# Patient Record
Sex: MALE | Race: WHITE | NOT HISPANIC OR LATINO | Employment: OTHER | URBAN - METROPOLITAN AREA
[De-identification: names, ages, dates, MRNs, and addresses within clinical notes are randomized per-mention and may not be internally consistent; named-entity substitution may affect disease eponyms.]

---

## 2017-01-12 ENCOUNTER — RESULTS ENCOUNTER (OUTPATIENT)
Dept: FAMILY MEDICINE CLINIC | Facility: CLINIC | Age: 72
End: 2017-01-12

## 2017-01-12 DIAGNOSIS — R73.01 IFG (IMPAIRED FASTING GLUCOSE): ICD-10-CM

## 2017-01-12 DIAGNOSIS — K62.5 BRBPR (BRIGHT RED BLOOD PER RECTUM): ICD-10-CM

## 2017-01-12 DIAGNOSIS — E78.5 HYPERLIPIDEMIA: ICD-10-CM

## 2017-02-09 LAB
ALBUMIN SERPL-MCNC: 3.8 G/DL (ref 3.5–4.8)
ALBUMIN/GLOB SERPL: 1.8 {RATIO} (ref 1.1–2.5)
ALP SERPL-CCNC: 103 IU/L (ref 39–117)
ALT SERPL-CCNC: 12 IU/L (ref 0–44)
AST SERPL-CCNC: 20 IU/L (ref 0–40)
BASOPHILS # BLD AUTO: 0 X10E3/UL (ref 0–0.2)
BASOPHILS NFR BLD AUTO: 0 %
BILIRUB SERPL-MCNC: 0.2 MG/DL (ref 0–1.2)
BUN SERPL-MCNC: 20 MG/DL (ref 8–27)
BUN/CREAT SERPL: 27 (ref 10–22)
CALCIUM SERPL-MCNC: 8.6 MG/DL (ref 8.6–10.2)
CHLORIDE SERPL-SCNC: 104 MMOL/L (ref 96–106)
CHOLEST SERPL-MCNC: 203 MG/DL (ref 100–199)
CO2 SERPL-SCNC: 22 MMOL/L (ref 18–29)
CREAT SERPL-MCNC: 0.73 MG/DL (ref 0.76–1.27)
EOSINOPHIL # BLD AUTO: 0.2 X10E3/UL (ref 0–0.4)
EOSINOPHIL NFR BLD AUTO: 4 %
ERYTHROCYTE [DISTWIDTH] IN BLOOD BY AUTOMATED COUNT: 14.1 % (ref 12.3–15.4)
GLOBULIN SER CALC-MCNC: 2.1 G/DL (ref 1.5–4.5)
GLUCOSE SERPL-MCNC: 105 MG/DL (ref 65–99)
HBA1C MFR BLD: 5.9 % (ref 4.8–5.6)
HCT VFR BLD AUTO: 40.9 % (ref 37.5–51)
HDLC SERPL-MCNC: 49 MG/DL
HGB BLD-MCNC: 13.6 G/DL (ref 12.6–17.7)
IMM GRANULOCYTES # BLD: 0 X10E3/UL (ref 0–0.1)
IMM GRANULOCYTES NFR BLD: 0 %
LDLC SERPL CALC-MCNC: 120 MG/DL (ref 0–99)
LYMPHOCYTES # BLD AUTO: 1 X10E3/UL (ref 0.7–3.1)
LYMPHOCYTES NFR BLD AUTO: 27 %
MCH RBC QN AUTO: 29.8 PG (ref 26.6–33)
MCHC RBC AUTO-ENTMCNC: 33.3 G/DL (ref 31.5–35.7)
MCV RBC AUTO: 90 FL (ref 79–97)
MONOCYTES # BLD AUTO: 0.3 X10E3/UL (ref 0.1–0.9)
MONOCYTES NFR BLD AUTO: 9 %
NEUTROPHILS # BLD AUTO: 2.4 X10E3/UL (ref 1.4–7)
NEUTROPHILS NFR BLD AUTO: 60 %
PLATELET # BLD AUTO: 173 X10E3/UL (ref 150–379)
POTASSIUM SERPL-SCNC: 4.2 MMOL/L (ref 3.5–5.2)
PROT SERPL-MCNC: 5.9 G/DL (ref 6–8.5)
RBC # BLD AUTO: 4.57 X10E6/UL (ref 4.14–5.8)
SODIUM SERPL-SCNC: 143 MMOL/L (ref 134–144)
TRIGL SERPL-MCNC: 171 MG/DL (ref 0–149)
VLDLC SERPL CALC-MCNC: 34 MG/DL (ref 5–40)
WBC # BLD AUTO: 3.9 X10E3/UL (ref 3.4–10.8)

## 2017-02-15 ENCOUNTER — OFFICE VISIT (OUTPATIENT)
Dept: FAMILY MEDICINE CLINIC | Facility: CLINIC | Age: 72
End: 2017-02-15

## 2017-02-15 VITALS
HEIGHT: 75 IN | TEMPERATURE: 98.2 F | WEIGHT: 282.2 LBS | OXYGEN SATURATION: 97 % | SYSTOLIC BLOOD PRESSURE: 122 MMHG | DIASTOLIC BLOOD PRESSURE: 78 MMHG | BODY MASS INDEX: 35.09 KG/M2 | HEART RATE: 67 BPM

## 2017-02-15 DIAGNOSIS — E78.2 MIXED HYPERLIPIDEMIA: Primary | ICD-10-CM

## 2017-02-15 DIAGNOSIS — R73.01 IFG (IMPAIRED FASTING GLUCOSE): ICD-10-CM

## 2017-02-15 PROCEDURE — 99213 OFFICE O/P EST LOW 20 MIN: CPT | Performed by: INTERNAL MEDICINE

## 2017-02-15 NOTE — PROGRESS NOTES
"Subjective   Ramiro Osborne is a 72 y.o. male who presents today for:    Hyperlipidemia ( 6 month f/u and review labs); Hematochezia; and Blood Sugar Problem (elevated glucose)    History of Present Illness   Mild dyslipidemia that has not been treated with any meds.  He does walk with his wife 4-5 times/week, with his wife, about 3 miles (in an hour).  He denies any CV or CVA/TIA symptoms.    Eval for BRBPR last year revealed hemorrhoids only.  It still recurs with straining against firm stools (only).    Mr. Osborne  reports that he has quit smoking. His smoking use included Cigarettes. He has never used smokeless tobacco. He reports that he drinks about 1.2 oz of alcohol per week  He reports that he does not use illicit drugs.         Current Outpatient Prescriptions:   •  aspirin 81 MG tablet, Take 81 mg by mouth daily., Disp: , Rfl:   •  ciclopirox (LOPROX) 1 % shampoo, , Disp: , Rfl:   •  fluocinolone (SYNALAR) 0.01 % external solution, as needed., Disp: , Rfl:       The following portions of the patient's history were reviewed and updated as appropriate: allergies, current medications, past social history and problem list.  He recently stopped Lupron.  He is s/p XRT (no prostatectomy).    Review of Systems   Constitutional: Negative for unexpected weight change.   Respiratory: Negative for shortness of breath.    Cardiovascular: Negative for chest pain and palpitations.   Gastrointestinal: Positive for anal bleeding (intermittent).       Objective   Vitals:    02/15/17 0906   BP: 122/78   BP Location: Left arm   Patient Position: Sitting   Cuff Size: Adult   Pulse: 67   Temp: 98.2 °F (36.8 °C)   TempSrc: Oral   SpO2: 97%   Weight: 282 lb 3.2 oz (128 kg)   Height: 75\" (190.5 cm)     Physical Exam   Constitutional: He appears well-developed.   Obese.   Eyes: Conjunctivae are normal. No scleral icterus.   Cardiovascular: Normal rate, regular rhythm and normal heart sounds.    Abdominal: Soft. He exhibits no " abdominal bruit.       Assessment/Plan   Ramiro was seen today for hyperlipidemia, hematochezia and blood sugar problem.    Diagnoses and all orders for this visit:    Mixed hyperlipidemia    IFG (impaired fasting glucose)    Labs reviewed; sugar and trigs remain elevated, but we will defer meds (at the patient's request, although he is aware of his risk of coronary event at 18%).  Recheck in 6 months.

## 2017-07-15 ENCOUNTER — RESULTS ENCOUNTER (OUTPATIENT)
Dept: FAMILY MEDICINE CLINIC | Facility: CLINIC | Age: 72
End: 2017-07-15

## 2017-07-15 DIAGNOSIS — E78.2 MIXED HYPERLIPIDEMIA: ICD-10-CM

## 2017-07-15 DIAGNOSIS — R73.01 IFG (IMPAIRED FASTING GLUCOSE): ICD-10-CM

## 2017-08-18 ENCOUNTER — OFFICE VISIT (OUTPATIENT)
Dept: FAMILY MEDICINE CLINIC | Facility: CLINIC | Age: 72
End: 2017-08-18

## 2017-08-18 VITALS
DIASTOLIC BLOOD PRESSURE: 74 MMHG | WEIGHT: 268.1 LBS | BODY MASS INDEX: 33.34 KG/M2 | OXYGEN SATURATION: 94 % | HEART RATE: 60 BPM | SYSTOLIC BLOOD PRESSURE: 112 MMHG | HEIGHT: 75 IN

## 2017-08-18 DIAGNOSIS — R73.01 IFG (IMPAIRED FASTING GLUCOSE): ICD-10-CM

## 2017-08-18 DIAGNOSIS — E78.2 MIXED HYPERLIPIDEMIA: Primary | ICD-10-CM

## 2017-08-18 PROCEDURE — 99212 OFFICE O/P EST SF 10 MIN: CPT | Performed by: INTERNAL MEDICINE

## 2017-08-18 NOTE — PROGRESS NOTES
"Subjective   Ramirorobb Osborne is a 72 y.o. male who presents today for:    Hyperlipidemia (6 month f/u) and Impaired Fasting Glucose    History of Present Illness   His sugar and lipids were elevated in February, sos we advocated for diet changes and resumption of regular exercise.  Records show simiilar fluctuations in the past, and they have  Responded to TLCs.    He has cut back on the calories, eating less, especially carbs.  Back to riding his bike regularly and using the rowing machine.     Mr. Osborne  reports that he has quit smoking. His smoking use included Cigarettes. He has never used smokeless tobacco. He reports that he drinks about 1.2 oz of alcohol per week  He reports that he does not use illicit drugs.     No Known Allergies    Current Outpatient Prescriptions:   •  aspirin 81 MG tablet, Take 81 mg by mouth daily., Disp: , Rfl:   •  ciclopirox (LOPROX) 1 % shampoo, , Disp: , Rfl:   •  fluocinolone (SYNALAR) 0.01 % external solution, as needed., Disp: , Rfl:       Review of Systems   Constitutional: Negative for diaphoresis.   Eyes: Negative for visual disturbance.        Blind in the left eye   Respiratory: Negative for cough and chest tightness.    Cardiovascular: Negative for chest pain, palpitations and leg swelling.   Gastrointestinal: Negative for abdominal pain.   Musculoskeletal: Negative for myalgias.   Neurological: Negative for dizziness, syncope, numbness and headaches.   Hematological: Does not bruise/bleed easily.       Objective   Vitals:    08/18/17 0959   BP: 112/74   BP Location: Left arm   Patient Position: Sitting   Cuff Size: Large Adult   Pulse: 60   SpO2: 94%   Weight: 268 lb 1.6 oz (122 kg)   Height: 75\" (190.5 cm)     Physical Exam   Constitutional: He appears well-developed and well-nourished.   Neck: Carotid bruit is not present.   Cardiovascular: Normal rate, regular rhythm and normal heart sounds.    Abdominal: Soft. There is no tenderness.       Assessment/Plan   Ramiro" was seen today for hyperlipidemia and impaired fasting glucose.    Diagnoses and all orders for this visit:    Mixed hyperlipidemia    IFG (impaired fasting glucose)    He has impressed me with the therapeutic lifestyle changes that have resulted in a significant amount of weight loss since we saw him in February.  I anticipate that the labs he will have drawn next week, fasting, will reflect this as well.  If all looks good, we will see him back in about 6 months after his wellness visit which is scheduled for October.  He knows to contact us in the meantime if there are any problems.

## 2017-08-23 LAB
ALBUMIN SERPL-MCNC: 3.9 G/DL (ref 3.5–4.8)
ALBUMIN/GLOB SERPL: 1.7 {RATIO} (ref 1.2–2.2)
ALP SERPL-CCNC: 101 IU/L (ref 39–117)
ALT SERPL-CCNC: 13 IU/L (ref 0–44)
AST SERPL-CCNC: 20 IU/L (ref 0–40)
BILIRUB SERPL-MCNC: 0.2 MG/DL (ref 0–1.2)
BUN SERPL-MCNC: 17 MG/DL (ref 8–27)
BUN/CREAT SERPL: 24 (ref 10–24)
CALCIUM SERPL-MCNC: 8.9 MG/DL (ref 8.6–10.2)
CHLORIDE SERPL-SCNC: 106 MMOL/L (ref 96–106)
CHOLEST SERPL-MCNC: 204 MG/DL (ref 100–199)
CO2 SERPL-SCNC: 20 MMOL/L (ref 18–29)
CREAT SERPL-MCNC: 0.71 MG/DL (ref 0.76–1.27)
GLOBULIN SER CALC-MCNC: 2.3 G/DL (ref 1.5–4.5)
GLUCOSE SERPL-MCNC: 104 MG/DL (ref 65–99)
HBA1C MFR BLD: 5.8 % (ref 4.8–5.6)
HDLC SERPL-MCNC: 48 MG/DL
LDLC SERPL CALC-MCNC: 116 MG/DL (ref 0–99)
POTASSIUM SERPL-SCNC: 4.2 MMOL/L (ref 3.5–5.2)
PROT SERPL-MCNC: 6.2 G/DL (ref 6–8.5)
SODIUM SERPL-SCNC: 142 MMOL/L (ref 134–144)
TRIGL SERPL-MCNC: 200 MG/DL (ref 0–149)
VLDLC SERPL CALC-MCNC: 40 MG/DL (ref 5–40)

## 2017-10-16 ENCOUNTER — OFFICE VISIT (OUTPATIENT)
Dept: FAMILY MEDICINE CLINIC | Facility: CLINIC | Age: 72
End: 2017-10-16

## 2017-10-16 VITALS
BODY MASS INDEX: 33.02 KG/M2 | HEART RATE: 59 BPM | SYSTOLIC BLOOD PRESSURE: 112 MMHG | WEIGHT: 265.6 LBS | HEIGHT: 75 IN | TEMPERATURE: 97.8 F | DIASTOLIC BLOOD PRESSURE: 80 MMHG | OXYGEN SATURATION: 95 %

## 2017-10-16 DIAGNOSIS — Z00.00 MEDICARE ANNUAL WELLNESS VISIT, SUBSEQUENT: Primary | ICD-10-CM

## 2017-10-16 PROCEDURE — G0439 PPPS, SUBSEQ VISIT: HCPCS | Performed by: NURSE PRACTITIONER

## 2017-10-16 RX ORDER — KETOCONAZOLE 20 MG/G
CREAM TOPICAL DAILY
COMMUNITY
End: 2021-12-01

## 2017-10-16 NOTE — PROGRESS NOTES
QUICK REFERENCE INFORMATION:  The ABCs of the Annual Wellness Visit    Subsequent Medicare Wellness Visit    HEALTH RISK ASSESSMENT    1945    Recent Hospitalizations:  No hospitalization(s) within the last year..        Current Medical Providers:  Patient Care Team:  Sam Tyson MD as PCP - General  Ravi Turner MD as PCP - Claims Attributed  Kris Domingo MD as Consulting Physician (Urology)  Ravi Turner MD as Consulting Physician (Dermatology)  Jessika Escobar MD as Consulting Physician (Ophthalmology)        Smoking Status:  History   Smoking Status   • Former Smoker   • Types: Cigarettes   Smokeless Tobacco   • Never Used     Comment: Quit in the 1980s       Alcohol Consumption:  History   Alcohol Use   • Yes     Comment: occasional       Depression Screen:   PHQ-2/PHQ-9 Depression Screening 10/16/2017   Little interest or pleasure in doing things 0   Feeling down, depressed, or hopeless 0   Total Score 0       Health Habits and Functional and Cognitive Screening:  Functional & Cognitive Status 10/16/2017   Do you have difficulty preparing food and eating? No   Do you have difficulty bathing yourself? No   Do you have difficulty getting dressed? No   Do you have difficulty using the toilet? No   Do you have difficulty moving around from place to place? No   In the past year have you fallen or experienced a near fall? No   Do you need help using the phone?  No   Are you deaf or do you have serious difficulty hearing?  Yes   Do you need help with transportation? No   Do you need help shopping? No   Do you need help preparing meals?  No   Do you need help with housework?  No   Do you need help with laundry? No   Do you need help taking your medications? No   Do you need help managing money? No   Do you have difficulty concentrating, remembering or making decisions? No       Health Habits  Current Diet: Well Balanced Diet  Dental Exam: Up to date (Dr. Everton Turner)  Eye Exam: Up to  date (dr. Jessika Paige)  Exercise (times per week): 3 times per week  Current Exercise Activities Include: Bicycling Outdoors      Does the patient have evidence of cognitive impairment? No    Aspirin use counseling: Taking ASA appropriately as indicated      Recent Lab Results:  CMP:  Lab Results   Component Value Date     (H) 08/22/2017    BUN 17 08/22/2017    CREATININE 0.71 (L) 08/22/2017    EGFRIFNONA 94 08/22/2017    EGFRIFAFRI 109 08/22/2017    BCR 24 08/22/2017     08/22/2017    K 4.2 08/22/2017    CO2 20 08/22/2017    CALCIUM 8.9 08/22/2017    PROTENTOTREF 6.2 08/22/2017    ALBUMIN 3.9 08/22/2017    LABGLOBREF 2.3 08/22/2017    LABIL2 1.7 08/22/2017    BILITOT 0.2 08/22/2017    ALKPHOS 101 08/22/2017    AST 20 08/22/2017    ALT 13 08/22/2017     Lipid Panel:  Lab Results   Component Value Date    TRIG 200 (H) 08/22/2017    HDL 48 08/22/2017    VLDL 40 08/22/2017     HbA1c:  Lab Results   Component Value Date    HGBA1C 5.8 (H) 08/22/2017       Visual Acuity:   Visual Acuity Screening    Right eye Left eye Both eyes   Without correction: 20/70     With correction: 20/50     Comments: Only Has Rt Eye      Age-appropriate Screening Schedule:  Refer to the list below for future screening recommendations based on patient's age, sex and/or medical conditions. Orders for these recommended tests are listed in the plan section. The patient has been provided with a written plan.    Health Maintenance   Topic Date Due   • TDAP/TD VACCINES (1 - Tdap) 02/15/1965   • ZOSTER VACCINE  02/12/2016   • COLONOSCOPY  03/26/2017   • INFLUENZA VACCINE  08/01/2017   • LIPID PANEL  08/22/2018   • PNEUMOCOCCAL VACCINES (65+ LOW/MEDIUM RISK)  Completed        Subjective   History of Present Illness    Ramiro Osborne is a 72 y.o. male who presents for an Subsequent Wellness Visit.    The following portions of the patient's history were reviewed and updated as appropriate: allergies, current medications, past family  "history, past medical history, past social history, past surgical history and problem list.    Outpatient Medications Prior to Visit   Medication Sig Dispense Refill   • aspirin 81 MG tablet Take 81 mg by mouth daily.     • ciclopirox (LOPROX) 1 % shampoo      • fluocinolone (SYNALAR) 0.01 % external solution as needed.       No facility-administered medications prior to visit.        Patient Active Problem List   Diagnosis   • Prostate cancer   • H/O colonoscopy with polypectomy       Advance Care Planning:  power of  for healthcare on file    Identification of Risk Factors:  Risk factors include: unhealthy diet and cardiovascular risk.    Review of Systems   Constitutional: Negative.    HENT: Negative.    Eyes: Negative.    Respiratory: Negative.    Cardiovascular: Negative.    Skin: Negative.    Neurological: Negative.    Psychiatric/Behavioral: Negative.    All other systems reviewed and are negative.      Compared to one year ago, the patient feels his physical health is better.  Compared to one year ago, the patient feels his mental health is better.    Objective     Physical Exam   Constitutional: He is oriented to person, place, and time. He appears well-developed and well-nourished.   Cardiovascular: Normal rate, regular rhythm and normal heart sounds.  Exam reveals no gallop and no friction rub.    No murmur heard.  Pulmonary/Chest: Effort normal and breath sounds normal.   Neurological: He is alert and oriented to person, place, and time.   Skin: Skin is warm and dry.   Psychiatric:   No acute distress   Vitals reviewed.      Vitals:    10/16/17 1011   BP: 112/80   BP Location: Left arm   Patient Position: Sitting   Cuff Size: Adult   Pulse: 59   Temp: 97.8 °F (36.6 °C)   TempSrc: Oral   SpO2: 95%   Weight: 265 lb 9.6 oz (120 kg)   Height: 75\" (190.5 cm)   PainSc: 0-No pain       Body mass index is 33.2 kg/(m^2).  Discussed the patient's BMI with him. The BMI is above average; BMI management plan " is completed.    Assessment/Plan   Patient Self-Management and Personalized Health Advice  The patient has been provided with information about: weight management and prevention of cardiac or vascular disease and preventive services including:   · none at this time.    Visit Diagnoses:  No diagnosis found.    No orders of the defined types were placed in this encounter.      Outpatient Encounter Prescriptions as of 10/16/2017   Medication Sig Dispense Refill   • aspirin 81 MG tablet Take 81 mg by mouth daily.     • ciclopirox (LOPROX) 1 % shampoo      • fluocinolone (SYNALAR) 0.01 % external solution as needed.     • ketoconazole (NIZORAL) 2 % cream Apply  topically Daily.       No facility-administered encounter medications on file as of 10/16/2017.        Reviewed use of high risk medication in the elderly: yes  Reviewed for potential of harmful drug interactions in the elderly: yes    Follow Up:  No Follow-up on file.     An After Visit Summary and PPPS with all of these plans were given to the patient.

## 2017-10-16 NOTE — PATIENT INSTRUCTIONS
Medicare Wellness  Personal Prevention Plan of Service     Date of Office Visit:  10/16/2017  Encounter Provider:  EARNESTINE Day  Place of Service:  [facility] INTERNAL MEDICINE  Patient Name: Ramiro Osborne  :  1945    As part of the Medicare Wellness portion of your visit today, we are providing you with this personalized preventive plan of services (PPPS). This plan is based upon recommendations of the [place] Preventive Services Task Force (USPSTF) and the Advisory Committee on Immunization Practices (ACIP).    This lists the preventive care services that should be considered, and provides dates of when you are due. Items listed as completed are up-to-date and do not require any further intervention.    Health Maintenance   Topic Date Due   • TDAP/TD VACCINES (1 - Tdap) 02/15/1965   • HEPATITIS C SCREENING  2016   • LIPID PANEL  2018   • MEDICARE ANNUAL WELLNESS  10/16/2018   • COLONOSCOPY  2026   • INFLUENZA VACCINE  Completed   • PNEUMOCOCCAL VACCINES (65+ LOW/MEDIUM RISK)  Completed   • AAA SCREEN (ONE-TIME)  Completed   • ZOSTER VACCINE  Completed       No orders of the defined types were placed in this encounter.      Return if symptoms worsen or fail to improve, for Next scheduled follow up.

## 2018-04-04 DIAGNOSIS — E78.2 MIXED HYPERLIPIDEMIA: Primary | ICD-10-CM

## 2018-04-04 DIAGNOSIS — R73.01 IFG (IMPAIRED FASTING GLUCOSE): ICD-10-CM

## 2018-04-08 ENCOUNTER — RESULTS ENCOUNTER (OUTPATIENT)
Dept: FAMILY MEDICINE CLINIC | Facility: CLINIC | Age: 73
End: 2018-04-08

## 2018-04-08 DIAGNOSIS — E78.2 MIXED HYPERLIPIDEMIA: ICD-10-CM

## 2018-04-08 DIAGNOSIS — R73.01 IFG (IMPAIRED FASTING GLUCOSE): ICD-10-CM

## 2018-04-12 LAB
ALBUMIN SERPL-MCNC: 4 G/DL (ref 3.5–4.8)
ALBUMIN/GLOB SERPL: 1.5 {RATIO} (ref 1.2–2.2)
ALP SERPL-CCNC: 104 IU/L (ref 39–117)
ALT SERPL-CCNC: 15 IU/L (ref 0–44)
AST SERPL-CCNC: 25 IU/L (ref 0–40)
BILIRUB SERPL-MCNC: 0.4 MG/DL (ref 0–1.2)
BUN SERPL-MCNC: 18 MG/DL (ref 8–27)
BUN/CREAT SERPL: 25 (ref 10–24)
CALCIUM SERPL-MCNC: 9 MG/DL (ref 8.6–10.2)
CHLORIDE SERPL-SCNC: 106 MMOL/L (ref 96–106)
CHOLEST SERPL-MCNC: 204 MG/DL (ref 100–199)
CO2 SERPL-SCNC: 22 MMOL/L (ref 18–29)
CREAT SERPL-MCNC: 0.73 MG/DL (ref 0.76–1.27)
GFR SERPLBLD CREATININE-BSD FMLA CKD-EPI: 107 ML/MIN/1.73
GFR SERPLBLD CREATININE-BSD FMLA CKD-EPI: 93 ML/MIN/1.73
GLOBULIN SER CALC-MCNC: 2.7 G/DL (ref 1.5–4.5)
GLUCOSE SERPL-MCNC: 99 MG/DL (ref 65–99)
HBA1C MFR BLD: 5.4 % (ref 4.8–5.6)
HDLC SERPL-MCNC: 47 MG/DL
LDLC SERPL CALC-MCNC: 131 MG/DL (ref 0–99)
POTASSIUM SERPL-SCNC: 4.3 MMOL/L (ref 3.5–5.2)
PROT SERPL-MCNC: 6.7 G/DL (ref 6–8.5)
SODIUM SERPL-SCNC: 143 MMOL/L (ref 134–144)
TRIGL SERPL-MCNC: 131 MG/DL (ref 0–149)
VLDLC SERPL CALC-MCNC: 26 MG/DL (ref 5–40)

## 2018-04-18 ENCOUNTER — OFFICE VISIT (OUTPATIENT)
Dept: FAMILY MEDICINE CLINIC | Facility: CLINIC | Age: 73
End: 2018-04-18

## 2018-04-18 VITALS
HEIGHT: 75 IN | WEIGHT: 281.8 LBS | BODY MASS INDEX: 35.04 KG/M2 | HEART RATE: 61 BPM | SYSTOLIC BLOOD PRESSURE: 132 MMHG | OXYGEN SATURATION: 96 % | DIASTOLIC BLOOD PRESSURE: 76 MMHG

## 2018-04-18 DIAGNOSIS — E78.2 MIXED HYPERLIPIDEMIA: Primary | ICD-10-CM

## 2018-04-18 DIAGNOSIS — R73.01 IFG (IMPAIRED FASTING GLUCOSE): ICD-10-CM

## 2018-04-18 PROCEDURE — 99212 OFFICE O/P EST SF 10 MIN: CPT | Performed by: INTERNAL MEDICINE

## 2018-04-18 NOTE — PROGRESS NOTES
"Hyperlipidemia (8 mo f/u and review labs)    His sugar and lipids were elevated in 2/2017, so we advocated for diet changes and resumption of regular exercise.  Records show simiilar fluctuations in the past, and they have  Responded to TLCs.     He has cut back on the calories, eating less, especially carbs.  He is 6 weeks into a workout program ([employer]) at the [facility].  He has not restarted his bike riding, but plans to.    He has quit smoking.        Current Outpatient Prescriptions:   •  aspirin 81 MG tablet, Take 81 mg by mouth daily., Disp: , Rfl:   •  ciclopirox (LOPROX) 1 % shampoo, , Disp: , Rfl:   •  fluocinolone (SYNALAR) 0.01 % external solution, as needed., Disp: , Rfl:   •  fluticasone (FLONASE) 50 MCG/ACT nasal spray, 2 sprays into each nostril Daily., Disp: 1 bottle, Rfl: 0  •  ketoconazole (NIZORAL) 2 % cream, Apply  topically Daily., Disp: , Rfl:     No Known Allergies    ROS: No TIA/CVA symptoms.  No angina.    Vitals:    04/18/18 1005   BP: 132/76   BP Location: Right arm   Patient Position: Sitting   Cuff Size: Large Adult   Pulse: 61   SpO2: 96%   Weight: 128 kg (281 lb 12.8 oz)   Height: 190.5 cm (75\")     EXAM:  Overweight, in good spirits.  RRR; no murmur.  No carotid bruit.  No neuro deficit.    Ramiro was seen today for hyperlipidemia.    Diagnoses and all orders for this visit:    Mixed hyperlipidemia    IFG (impaired fasting glucose)    Labs done in anticipation of today's office visit were reviewed with the patient today.  A1c is in the normal range.  Triglycerides are much better.  LDL has risen slightly, likely as a result of the drop in triglycerides.  Blood pressure is adequately controlled as well.  He will continue with the last modifications that have all of his numbers look better and follow-up in 6 months for a wellness visit.  We will recheck labs at that time as well.      "

## 2018-06-04 DIAGNOSIS — R73.01 IFG (IMPAIRED FASTING GLUCOSE): ICD-10-CM

## 2018-06-04 DIAGNOSIS — E78.2 MIXED HYPERLIPIDEMIA: Primary | ICD-10-CM

## 2018-09-30 ENCOUNTER — RESULTS ENCOUNTER (OUTPATIENT)
Dept: FAMILY MEDICINE CLINIC | Facility: CLINIC | Age: 73
End: 2018-09-30

## 2018-09-30 DIAGNOSIS — E78.2 MIXED HYPERLIPIDEMIA: ICD-10-CM

## 2018-09-30 DIAGNOSIS — R73.01 IFG (IMPAIRED FASTING GLUCOSE): ICD-10-CM

## 2018-10-18 LAB
ALBUMIN SERPL-MCNC: 4 G/DL (ref 3.5–4.8)
ALBUMIN/GLOB SERPL: 1.6 {RATIO} (ref 1.2–2.2)
ALP SERPL-CCNC: 110 IU/L (ref 39–117)
ALT SERPL-CCNC: 13 IU/L (ref 0–44)
AST SERPL-CCNC: 24 IU/L (ref 0–40)
BILIRUB SERPL-MCNC: 0.4 MG/DL (ref 0–1.2)
BUN SERPL-MCNC: 14 MG/DL (ref 8–27)
BUN/CREAT SERPL: 19 (ref 10–24)
CALCIUM SERPL-MCNC: 8.8 MG/DL (ref 8.6–10.2)
CHLORIDE SERPL-SCNC: 107 MMOL/L (ref 96–106)
CHOLEST SERPL-MCNC: 173 MG/DL (ref 100–199)
CO2 SERPL-SCNC: 20 MMOL/L (ref 20–29)
CREAT SERPL-MCNC: 0.75 MG/DL (ref 0.76–1.27)
GLOBULIN SER CALC-MCNC: 2.5 G/DL (ref 1.5–4.5)
GLUCOSE SERPL-MCNC: 103 MG/DL (ref 65–99)
HBA1C MFR BLD: 5.6 % (ref 4.8–5.6)
HDLC SERPL-MCNC: 45 MG/DL
LDLC SERPL CALC-MCNC: 103 MG/DL (ref 0–99)
POTASSIUM SERPL-SCNC: 4.3 MMOL/L (ref 3.5–5.2)
PROT SERPL-MCNC: 6.5 G/DL (ref 6–8.5)
SODIUM SERPL-SCNC: 142 MMOL/L (ref 134–144)
TRIGL SERPL-MCNC: 125 MG/DL (ref 0–149)
VLDLC SERPL CALC-MCNC: 25 MG/DL (ref 5–40)

## 2018-10-24 ENCOUNTER — OFFICE VISIT (OUTPATIENT)
Dept: FAMILY MEDICINE CLINIC | Facility: CLINIC | Age: 73
End: 2018-10-24

## 2018-10-24 VITALS
BODY MASS INDEX: 34.37 KG/M2 | DIASTOLIC BLOOD PRESSURE: 62 MMHG | WEIGHT: 276.4 LBS | SYSTOLIC BLOOD PRESSURE: 124 MMHG | OXYGEN SATURATION: 98 % | HEIGHT: 75 IN | HEART RATE: 57 BPM

## 2018-10-24 DIAGNOSIS — Z00.00 ROUTINE GENERAL MEDICAL EXAMINATION AT HEALTH CARE FACILITY: Primary | ICD-10-CM

## 2018-10-24 PROCEDURE — G0439 PPPS, SUBSEQ VISIT: HCPCS | Performed by: INTERNAL MEDICINE

## 2018-10-24 NOTE — PATIENT INSTRUCTIONS
Medicare Wellness  Personal Prevention Plan of Service     Date of Office Visit:  10/24/2018  Encounter Provider:  Sam Tyson MD  Place of Service:  [facility] INTERNAL MEDICINE  Patient Name: Ramiro Osborne  :  1945    As part of the Medicare Wellness portion of your visit today, we are providing you with this personalized preventive plan of services (PPPS). This plan is based upon recommendations of the [place] Preventive Services Task Force (USPSTF) and the Advisory Committee on Immunization Practices (ACIP).    This lists the preventive care services that should be considered, and provides dates of when you are due. Items listed as completed are up-to-date and do not require any further intervention.    Health Maintenance   Topic Date Due   • HEPATITIS C SCREENING  W/ next labs   • ZOSTER VACCINE (2 of 2) At the pharmacy, when available   • MEDICARE ANNUAL WELLNESS  10/2019   • LIPID PANEL  10/17/2019   • TDAP/TD VACCINES (2 - Td) 2023   • COLONOSCOPY  2026   • INFLUENZA VACCINE  Completed   • PNEUMOCOCCAL VACCINES (65+ LOW/MEDIUM RISK)  Completed   • AAA SCREEN (ONE-TIME)  Completed    HEPATITIS A #2 AFTER 2019       No orders of the defined types were placed in this encounter.      Return in about 6 months (around 2019) for 15 min, FASTING LABS PRIOR.

## 2018-10-24 NOTE — PROGRESS NOTES
QUICK REFERENCE INFORMATION:  The ABCs of the Annual Wellness Visit    Subsequent Medicare Wellness Visit    HEALTH RISK ASSESSMENT    1945    Recent Hospitalizations:  No hospitalization(s) within the last year..      Current Medical Providers:  Patient Care Team:  Sam Tyson MD as PCP - General  Sam Tyson MD as PCP - Claims Attributed  Kris Domingo MD as Consulting Physician (Urology)  Ravi Turner MD as Consulting Physician (Dermatology)  Jessika Escobar MD as Consulting Physician (Ophthalmology)        Smoking Status:  History   Smoking Status   • Former Smoker   • Types: Cigarettes   Smokeless Tobacco   • Never Used     Comment: Quit in the 1980s       Alcohol Consumption:  History   Alcohol Use   • Yes     Comment: occasional       Depression Screen:   PHQ-2/PHQ-9 Depression Screening 10/24/2018   Little interest or pleasure in doing things 0   Feeling down, depressed, or hopeless 0   Total Score 0       Health Habits and Functional and Cognitive Screening:  Functional & Cognitive Status 10/24/2018   Do you have difficulty preparing food and eating? No   Do you have difficulty bathing yourself, getting dressed or grooming yourself? No   Do you have difficulty using the toilet? No   Do you have difficulty moving around from place to place? No   Do you have trouble with steps or getting out of a bed or a chair? No   In the past year have you fallen or experienced a near fall? Yes   Current Diet Well Balanced Diet   Dental Exam Up to date   Eye Exam Up to date   Exercise (times per week) 5 times per week   Current Exercise Activities Include Cardiovasular Workout on Exercise Equipment   Do you need help using the phone?  No   Are you deaf or do you have serious difficulty hearing?  No   Do you need help with transportation? No   Do you need help shopping? No   Do you need help preparing meals?  No   Do you need help with housework?  No   Do you need help with laundry?  No   Do you need help taking your medications? No   Do you need help managing money? No   Do you ever drive or ride in a car without wearing a seat belt? No   Have you felt unusual stress, anger or loneliness in the last month? No   Who do you live with? Spouse   If you need help, do you have trouble finding someone available to you? No   Have you been bothered in the last four weeks by sexual problems? No   Do you have difficulty concentrating, remembering or making decisions? No           Does the patient have evidence of cognitive impairment? No    Aspirin use counseling: Does not need ASA (and currently is not on it)      Recent Lab Results:  CMP:  Lab Results   Component Value Date     (H) 10/17/2018    BUN 14 10/17/2018    CREATININE 0.75 (L) 10/17/2018    EGFRIFNONA 92 10/17/2018    EGFRIFAFRI 106 10/17/2018    BCR 19 10/17/2018     10/17/2018    K 4.3 10/17/2018    CO2 20 10/17/2018    CALCIUM 8.8 10/17/2018    PROTENTOTREF 6.5 10/17/2018    ALBUMIN 4.0 10/17/2018    LABGLOBREF 2.5 10/17/2018    LABIL2 1.6 10/17/2018    BILITOT 0.4 10/17/2018    ALKPHOS 110 10/17/2018    AST 24 10/17/2018    ALT 13 10/17/2018     Lipid Panel:  Lab Results   Component Value Date    TRIG 125 10/17/2018    HDL 45 10/17/2018    VLDL 25 10/17/2018     HbA1c:  Lab Results   Component Value Date    HGBA1C 5.6 10/17/2018       Visual Acuity:  No exam data present    Age-appropriate Screening Schedule:  Refer to the list below for future screening recommendations based on patient's age, sex and/or medical conditions. Orders for these recommended tests are listed in the plan section. The patient has been provided with a written plan.    Health Maintenance   Topic Date Due   • ZOSTER VACCINE (2 of 2) 12/11/2017   • LIPID PANEL  10/17/2019   • TDAP/TD VACCINES (2 - Td) 11/19/2023   • COLONOSCOPY  04/18/2026   • INFLUENZA VACCINE  Completed   • PNEUMOCOCCAL VACCINES (65+ LOW/MEDIUM RISK)  Completed        Subjective  "  History of Present Illness    Ramiro Osborne is a 73 y.o. male who presents for an Subsequent Wellness Visit.    The following portions of the patient's history were reviewed and updated as appropriate: allergies, current medications, past family history, past medical history, past social history, past surgical history and problem list.    Outpatient Medications Prior to Visit   Medication Sig Dispense Refill   • ciclopirox (LOPROX) 1 % shampoo      • fluocinolone (SYNALAR) 0.01 % external solution as needed.     • fluticasone (FLONASE) 50 MCG/ACT nasal spray 2 sprays into each nostril Daily. 1 bottle 0   • aspirin 81 MG tablet Take 81 mg by mouth daily.     • ketoconazole (NIZORAL) 2 % cream Apply  topically Daily.       No facility-administered medications prior to visit.        Patient Active Problem List   Diagnosis   • Prostate cancer (CMS/HCC)   • H/O colonoscopy with polypectomy       Advance Care Planning:  has an advance directive - a copy has been provided and is in file    Identification of Risk Factors:  Risk factors include: weight , cardiovascular risk and vision limitations.    Review of Systems   Constitutional: Negative for unexpected weight change.   Eyes:        Blind in the LT eye   Musculoskeletal: Positive for arthralgias (LT knee pain at times).   Hematological: Does not bruise/bleed easily.   All other systems reviewed and are negative.      Compared to one year ago, the patient feels his physical health is the same.  Compared to one year ago, the patient feels his mental health is the same.    Objective     Physical Exam    Vitals:    10/24/18 1114   BP: 124/62   Pulse: 57   SpO2: 98%   Weight: 125 kg (276 lb 6.4 oz)   Height: 190.5 cm (75\")       Patient's Body mass index is 34.55 kg/m². BMI is above normal parameters. Recommendations include: nutrition counseling.    Is alert and oriented ×4 and answers all questions appropriately  Mood is upbeat and affect is appropriate  He is able to " ascend and descend from the exam table fluidly and without assistance.      Assessment/Plan   Patient Self-Management and Personalized Health Advice  The patient has been provided with information about: diet, exercise, weight management and prevention of cardiac or vascular disease and preventive services including:   · Advance directive, Shingrix vaccine (Herpes Zoster).    Visit Diagnoses:    ICD-10-CM ICD-9-CM   1. Routine general medical examination at [facility] care facility Z00.00 V70.0       No orders of the defined types were placed in this encounter.      Outpatient Encounter Prescriptions as of 10/24/2018   Medication Sig Dispense Refill   • ciclopirox (LOPROX) 1 % shampoo      • fluocinolone (SYNALAR) 0.01 % external solution as needed.     • fluticasone (FLONASE) 50 MCG/ACT nasal spray 2 sprays into each nostril Daily. 1 bottle 0   • aspirin 81 MG tablet Take 81 mg by mouth daily.     • ketoconazole (NIZORAL) 2 % cream Apply  topically Daily.       No facility-administered encounter medications on file as of 10/24/2018.        Reviewed use of high risk medication in the elderly: not applicable  Reviewed for potential of harmful drug interactions in the elderly: not applicable    Follow Up:  Return in about 6 months (around 4/24/2019) for 15 min, FASTING LABS PRIOR.     An After Visit Summary and PPPS with all of these plans were given to the patient.        He appears to be doing very well.  We reviewed the labs that were done in anticipation of today's office visit, noting the slightly elevated blood sugar with a normal A1c and a normal triglyceride levels.  He will continue to watch his diet, cutting back on the starches in particular, so that hopefully his weight will continue to come down.  Otherwise, he is doing excellent.

## 2019-01-21 ENCOUNTER — APPOINTMENT (OUTPATIENT)
Dept: GENERAL RADIOLOGY | Facility: HOSPITAL | Age: 74
End: 2019-01-21

## 2019-01-21 ENCOUNTER — HOSPITAL ENCOUNTER (INPATIENT)
Facility: HOSPITAL | Age: 74
LOS: 2 days | Discharge: HOME | End: 2019-01-23
Attending: EMERGENCY MEDICINE | Admitting: INTERNAL MEDICINE

## 2019-01-21 DIAGNOSIS — T17.908A ASPIRATION OF FOREIGN BODY, INITIAL ENCOUNTER: ICD-10-CM

## 2019-01-21 DIAGNOSIS — J69.0 ASPIRATION PNEUMONIA OF RIGHT MIDDLE LOBE, UNSPECIFIED ASPIRATION PNEUMONIA TYPE (HCC): Primary | ICD-10-CM

## 2019-01-21 DIAGNOSIS — R79.89 ELEVATED D-DIMER: ICD-10-CM

## 2019-01-21 DIAGNOSIS — A41.9 SEPSIS, DUE TO UNSPECIFIED ORGANISM: ICD-10-CM

## 2019-01-21 LAB
ALBUMIN SERPL-MCNC: 3.9 G/DL (ref 3.5–5.2)
ALBUMIN/GLOB SERPL: 1.3 G/DL
ALP SERPL-CCNC: 91 U/L (ref 39–117)
ALT SERPL W P-5'-P-CCNC: 24 U/L (ref 1–41)
ANION GAP SERPL CALCULATED.3IONS-SCNC: 17.3 MMOL/L
AST SERPL-CCNC: 31 U/L (ref 1–40)
BASOPHILS # BLD AUTO: 0.01 10*3/MM3 (ref 0–0.2)
BASOPHILS NFR BLD AUTO: 0.1 % (ref 0–1.5)
BILIRUB SERPL-MCNC: 0.7 MG/DL (ref 0.1–1.2)
BUN BLD-MCNC: 17 MG/DL (ref 8–23)
BUN/CREAT SERPL: 22.4 (ref 7–25)
CALCIUM SPEC-SCNC: 8.8 MG/DL (ref 8.6–10.5)
CHLORIDE SERPL-SCNC: 104 MMOL/L (ref 98–107)
CO2 SERPL-SCNC: 16.7 MMOL/L (ref 22–29)
CREAT BLD-MCNC: 0.76 MG/DL (ref 0.76–1.27)
D DIMER PPP FEU-MCNC: 1.67 MCGFEU/ML (ref 0–0.49)
D-LACTATE SERPL-SCNC: 2.7 MMOL/L (ref 0.5–2)
DEPRECATED RDW RBC AUTO: 47.9 FL (ref 37–54)
EOSINOPHIL # BLD AUTO: 0.01 10*3/MM3 (ref 0–0.7)
EOSINOPHIL NFR BLD AUTO: 0.1 % (ref 0.3–6.2)
ERYTHROCYTE [DISTWIDTH] IN BLOOD BY AUTOMATED COUNT: 14.2 % (ref 11.5–14.5)
GFR SERPL CREATININE-BSD FRML MDRD: 101 ML/MIN/1.73
GLOBULIN UR ELPH-MCNC: 3 GM/DL
GLUCOSE BLD-MCNC: 102 MG/DL (ref 65–99)
HCT VFR BLD AUTO: 42.6 % (ref 40.4–52.2)
HGB BLD-MCNC: 14.5 G/DL (ref 13.7–17.6)
HOLD SPECIMEN: NORMAL
IMM GRANULOCYTES # BLD AUTO: 0.03 10*3/MM3 (ref 0–0.03)
IMM GRANULOCYTES NFR BLD AUTO: 0.3 % (ref 0–0.5)
LYMPHOCYTES # BLD AUTO: 0.35 10*3/MM3 (ref 0.9–4.8)
LYMPHOCYTES NFR BLD AUTO: 3.2 % (ref 19.6–45.3)
MCH RBC QN AUTO: 31.3 PG (ref 27–32.7)
MCHC RBC AUTO-ENTMCNC: 34 G/DL (ref 32.6–36.4)
MCV RBC AUTO: 91.8 FL (ref 79.8–96.2)
MONOCYTES # BLD AUTO: 0.91 10*3/MM3 (ref 0.2–1.2)
MONOCYTES NFR BLD AUTO: 8.4 % (ref 5–12)
NEUTROPHILS # BLD AUTO: 9.54 10*3/MM3 (ref 1.9–8.1)
NEUTROPHILS NFR BLD AUTO: 88.2 % (ref 42.7–76)
PLAT MORPH BLD: NORMAL
PLATELET # BLD AUTO: 136 10*3/MM3 (ref 140–500)
PMV BLD AUTO: 13.7 FL (ref 6–12)
POTASSIUM BLD-SCNC: 4.1 MMOL/L (ref 3.5–5.2)
PROCALCITONIN SERPL-MCNC: 0.18 NG/ML (ref 0.1–0.25)
PROT SERPL-MCNC: 6.9 G/DL (ref 6–8.5)
RBC # BLD AUTO: 4.64 10*6/MM3 (ref 4.6–6)
RBC MORPH BLD: NORMAL
SODIUM BLD-SCNC: 138 MMOL/L (ref 136–145)
TROPONIN T SERPL-MCNC: <0.01 NG/ML (ref 0–0.03)
WBC MORPH BLD: NORMAL
WBC NRBC COR # BLD: 10.82 10*3/MM3 (ref 4.5–10.7)

## 2019-01-21 PROCEDURE — 36415 COLL VENOUS BLD VENIPUNCTURE: CPT | Performed by: EMERGENCY MEDICINE

## 2019-01-21 PROCEDURE — 84145 PROCALCITONIN (PCT): CPT | Performed by: EMERGENCY MEDICINE

## 2019-01-21 PROCEDURE — 25010000002 ENOXAPARIN PER 10 MG: Performed by: EMERGENCY MEDICINE

## 2019-01-21 PROCEDURE — 93010 ELECTROCARDIOGRAM REPORT: CPT | Performed by: INTERNAL MEDICINE

## 2019-01-21 PROCEDURE — 93005 ELECTROCARDIOGRAM TRACING: CPT | Performed by: EMERGENCY MEDICINE

## 2019-01-21 PROCEDURE — 85007 BL SMEAR W/DIFF WBC COUNT: CPT | Performed by: EMERGENCY MEDICINE

## 2019-01-21 PROCEDURE — 85025 COMPLETE CBC W/AUTO DIFF WBC: CPT | Performed by: EMERGENCY MEDICINE

## 2019-01-21 PROCEDURE — 83605 ASSAY OF LACTIC ACID: CPT | Performed by: EMERGENCY MEDICINE

## 2019-01-21 PROCEDURE — 25010000002 AZITHROMYCIN PER 500 MG: Performed by: EMERGENCY MEDICINE

## 2019-01-21 PROCEDURE — 84484 ASSAY OF TROPONIN QUANT: CPT | Performed by: EMERGENCY MEDICINE

## 2019-01-21 PROCEDURE — 80053 COMPREHEN METABOLIC PANEL: CPT | Performed by: EMERGENCY MEDICINE

## 2019-01-21 PROCEDURE — 71046 X-RAY EXAM CHEST 2 VIEWS: CPT

## 2019-01-21 PROCEDURE — 99285 EMERGENCY DEPT VISIT HI MDM: CPT

## 2019-01-21 PROCEDURE — 25010000002 CEFTRIAXONE PER 250 MG: Performed by: EMERGENCY MEDICINE

## 2019-01-21 PROCEDURE — 87040 BLOOD CULTURE FOR BACTERIA: CPT | Performed by: EMERGENCY MEDICINE

## 2019-01-21 PROCEDURE — 99284 EMERGENCY DEPT VISIT MOD MDM: CPT

## 2019-01-21 PROCEDURE — 85379 FIBRIN DEGRADATION QUANT: CPT | Performed by: EMERGENCY MEDICINE

## 2019-01-21 RX ORDER — ASPIRIN 325 MG
325 TABLET ORAL ONCE
Status: DISCONTINUED | OUTPATIENT
Start: 2019-01-21 | End: 2019-01-21

## 2019-01-21 RX ORDER — SODIUM CHLORIDE 0.9 % (FLUSH) 0.9 %
3 SYRINGE (ML) INJECTION EVERY 12 HOURS SCHEDULED
Status: DISCONTINUED | OUTPATIENT
Start: 2019-01-22 | End: 2019-01-23 | Stop reason: HOSPADM

## 2019-01-21 RX ORDER — CEFTRIAXONE SODIUM 1 G/50ML
1 INJECTION, SOLUTION INTRAVENOUS ONCE
Status: COMPLETED | OUTPATIENT
Start: 2019-01-21 | End: 2019-01-21

## 2019-01-21 RX ORDER — NITROGLYCERIN 0.4 MG/1
0.4 TABLET SUBLINGUAL
Status: DISCONTINUED | OUTPATIENT
Start: 2019-01-21 | End: 2019-01-21

## 2019-01-21 RX ORDER — SODIUM CHLORIDE 0.9 % (FLUSH) 0.9 %
3-10 SYRINGE (ML) INJECTION AS NEEDED
Status: DISCONTINUED | OUTPATIENT
Start: 2019-01-21 | End: 2019-01-23 | Stop reason: HOSPADM

## 2019-01-21 RX ORDER — ACETAMINOPHEN 325 MG/1
650 TABLET ORAL EVERY 4 HOURS PRN
Status: DISCONTINUED | OUTPATIENT
Start: 2019-01-21 | End: 2019-01-23 | Stop reason: HOSPADM

## 2019-01-21 RX ORDER — DEXTROSE MONOHYDRATE, SODIUM CHLORIDE, SODIUM LACTATE, POTASSIUM CHLORIDE, CALCIUM CHLORIDE 5; 600; 310; 179; 20 G/100ML; MG/100ML; MG/100ML; MG/100ML; MG/100ML
100 INJECTION, SOLUTION INTRAVENOUS CONTINUOUS
Status: DISCONTINUED | OUTPATIENT
Start: 2019-01-22 | End: 2019-01-23 | Stop reason: HOSPADM

## 2019-01-21 RX ORDER — SODIUM CHLORIDE 0.9 % (FLUSH) 0.9 %
10 SYRINGE (ML) INJECTION AS NEEDED
Status: DISCONTINUED | OUTPATIENT
Start: 2019-01-21 | End: 2019-01-23 | Stop reason: HOSPADM

## 2019-01-21 RX ADMIN — CEFTRIAXONE SODIUM 1 G: 1 INJECTION, SOLUTION INTRAVENOUS at 19:40

## 2019-01-21 RX ADMIN — SODIUM CHLORIDE, POTASSIUM CHLORIDE, SODIUM LACTATE AND CALCIUM CHLORIDE 1000 ML: 600; 310; 30; 20 INJECTION, SOLUTION INTRAVENOUS at 21:37

## 2019-01-21 RX ADMIN — AZITHROMYCIN MONOHYDRATE 500 MG: 500 INJECTION, POWDER, LYOPHILIZED, FOR SOLUTION INTRAVENOUS at 20:29

## 2019-01-21 RX ADMIN — SODIUM CHLORIDE 1000 ML: 9 INJECTION, SOLUTION INTRAVENOUS at 19:27

## 2019-01-21 RX ADMIN — ENOXAPARIN SODIUM 120 MG: 60 INJECTION SUBCUTANEOUS at 22:19

## 2019-01-21 NOTE — ED PROVIDER NOTES
" EMERGENCY DEPARTMENT ENCOUNTER    CHIEF COMPLAINT  Chief Complaint: Chest pain  History given by: Pt  History limited by: Nothing  Room Number: 02/02  PMD: Sam Tyson MD      HPI:  Pt is a 73 y.o. male who presents complaining of \"burning\" chest pain after he swallowed a tumeric pill 3 hours ago. Pt reports his pain is worse with breathing, but is not worse with exertion. He does not that exertion does make him SOA. Pt also c/o sore throat, fatigue, lightheadedness, and SOA since swallowing the pill. Pt denies difficulty swallowing, diaphoresis, N/V, abdominal pain, or dizziness. He denies a hx of HTN, DM, HLD, or a family hx of heart disease. Pt denies a hx of smoking or EtOH abuse.     Duration:  3 hour  Onset: sudden  Timing: constant  Location: chest  Quality: \"burning\"  Intensity/Severity: moderate  Progression: unchanged  Associated Symptoms: sore throat, fatigue, SOA, lightheadedness  Aggravating Factors: none  Alleviating Factors: none  Previous Episodes: None stated  Treatment before arrival: Pt states he ingested a tumeric pill 3 hours ago    PAST MEDICAL HISTORY  Active Ambulatory Problems     Diagnosis Date Noted   • Prostate cancer (CMS/HCC) 02/12/2016   • H/O colonoscopy with polypectomy 02/12/2016     Resolved Ambulatory Problems     Diagnosis Date Noted   • Rectal bleeding 08/15/2016   • Blood in stool 08/15/2016     Past Medical History:   Diagnosis Date   • Blood in stool    • Prostate cancer (CMS/HCC)    • Skin infection    • Tinnitus        PAST SURGICAL HISTORY  Past Surgical History:   Procedure Laterality Date   • COLONOSCOPY  02/2011    Within Normal Limits. Dr. Lema. 03/2007- 9mm polyps   • EYE ENUCLEATION     • PILONIDAL CYSTECTOMY     • WRIST SURGERY         FAMILY HISTORY  Family History   Problem Relation Age of Onset   • Emphysema Mother    • COPD Mother    • Lung cancer Father    • Cancer Sister         Uterine       SOCIAL HISTORY  Social History     Socioeconomic " History   • Marital status:      Spouse name: Not on file   • Number of children: Not on file   • Years of education: Not on file   • Highest education level: Not on file   Social Needs   • Financial resource strain: Not on file   • Food insecurity - worry: Not on file   • Food insecurity - inability: Not on file   • Transportation needs - medical: Not on file   • Transportation needs - non-medical: Not on file   Occupational History   • Not on file   Tobacco Use   • Smoking status: Former Smoker     Types: Cigarettes   • Smokeless tobacco: Never Used   • Tobacco comment: Quit in the 1980s   Substance and Sexual Activity   • Alcohol use: Yes     Comment: occasional   • Drug use: No   • Sexual activity: No     Partners: Female   Other Topics Concern   • Not on file   Social History Narrative    Exercises somewhat regularly; stationary bike and rowing machine.       ALLERGIES  Patient has no known allergies.    REVIEW OF SYSTEMS  Review of Systems   Constitutional: Positive for fatigue. Negative for activity change, appetite change and fever.   HENT: Positive for sore throat. Negative for congestion.    Eyes: Negative.    Respiratory: Positive for shortness of breath. Negative for cough.    Cardiovascular: Positive for chest pain. Negative for leg swelling.   Gastrointestinal: Negative for abdominal pain, diarrhea and vomiting.   Endocrine: Negative.    Genitourinary: Negative for decreased urine volume and dysuria.   Musculoskeletal: Negative for neck pain.   Skin: Negative for rash and wound.   Allergic/Immunologic: Negative.    Neurological: Positive for light-headedness. Negative for weakness, numbness and headaches.   Hematological: Negative.    Psychiatric/Behavioral: Negative.    All other systems reviewed and are negative.      PHYSICAL EXAM  ED Triage Vitals [01/21/19 1746]   Temp Heart Rate Resp BP SpO2   97.5 °F (36.4 °C) 79 18 126/75 95 %      Temp src Heart Rate Source Patient Position BP Location  FiO2 (%)   Tympanic Monitor -- -- --         Physical Exam   Constitutional: He is oriented to person, place, and time. He appears distressed (mild).   HENT:   Head: Normocephalic and atraumatic.   Mouth/Throat: Oropharynx is clear and moist.   Eyes: EOM are normal. Pupils are equal, round, and reactive to light.   Neck: Normal range of motion. Neck supple.   Cardiovascular: Normal rate, regular rhythm and normal heart sounds.   Pulmonary/Chest: Effort normal and breath sounds normal. No respiratory distress.   Decreased breath sounds bibasilar   Abdominal: Soft. Bowel sounds are normal. He exhibits no distension. There is no tenderness. There is no rebound and no guarding.   Musculoskeletal: Normal range of motion. He exhibits no edema.   Lymphadenopathy:     He has no cervical adenopathy.   Neurological: He is alert and oriented to person, place, and time. He has normal sensation and normal strength.   Skin: Skin is warm and dry.   Psychiatric: Mood and affect normal.   Nursing note and vitals reviewed.      LAB RESULTS  Lab Results (last 24 hours)     Procedure Component Value Units Date/Time    Comprehensive Metabolic Panel [[id number]]  (Abnormal) Collected:  01/21/19 1948    Specimen:  Blood Updated:  01/21/19 2048     Glucose 102 mg/dL      BUN 17 mg/dL      Creatinine 0.76 mg/dL      Sodium 138 mmol/L      Potassium 4.1 mmol/L      Chloride 104 mmol/L      CO2 16.7 mmol/L      Calcium 8.8 mg/dL      Total Protein 6.9 g/dL      Albumin 3.90 g/dL      ALT (SGPT) 24 U/L      AST (SGOT) 31 U/L      Alkaline Phosphatase 91 U/L      Total Bilirubin 0.7 mg/dL      eGFR Non African Amer 101 mL/min/1.73      Globulin 3.0 gm/dL      A/G Ratio 1.3 g/dL      BUN/Creatinine Ratio 22.4     Anion Gap 17.3 mmol/L     Narrative:       The MDRD GFR formula is only valid for adults with stable renal function between ages 18 and 70.    Troponin [[id number]]  (Normal) Collected:  01/21/19 1948    Specimen:  Blood Updated:   "01/21/19 2048     Troponin T <0.010 ng/mL     Narrative:       Troponin T Reference Ranges:  Less than 0.03 ng/mL:    Negative for AMI  0.03 to 0.09 ng/mL:      Indeterminant for AMI  Greater than 0.09 ng/mL: Positive for AMI    Blood Culture - Blood, Arm, Left [[id number]] Collected:  01/21/19 1948    Specimen:  Blood from Arm, Left Updated:  01/21/19 2008    Lactic Acid, Plasma [[id number]]  (Abnormal) Collected:  01/21/19 1948    Specimen:  Blood Updated:  01/21/19 2034     Lactate 2.7 mmol/L     Procalcitonin [[id number]]  (Normal) Collected:  01/21/19 1948    Specimen:  Blood Updated:  01/21/19 2053     Procalcitonin 0.18 ng/mL     Narrative:       As a Marker for Sepsis (Non-Neonates):   1. <0.5 ng/mL represents a low risk of severe sepsis and/or septic shock.  1. >2 ng/mL represents a high risk of severe sepsis and/or septic shock.    As a Marker for Lower Respiratory Tract Infections that require antibiotic therapy:  PCT on Admission     Antibiotic Therapy             6-12 Hrs later  > 0.5                Strongly Recommended            >0.25 - <0.5         Recommended  0.1 - 0.25           Discouraged                   Remeasure/reassess PCT  <0.1                 Strongly Discouraged          Remeasure/reassess PCT      As 28 day mortality risk marker: \"Change in Procalcitonin Result\" (> 80 % or <=80 %) if Day 0 (or Day 1) and Day 4 values are available. Refer to http://www.[employer]-pct-calculator.com/   Change in PCT <=80 %   A decrease of PCT levels below or equal to 80 % defines a positive change in PCT test result representing a higher risk for 28-day all-cause mortality of patients diagnosed with severe sepsis or septic shock.  Change in PCT > 80 %   A decrease of PCT levels of more than 80 % defines a negative change in PCT result representing a lower risk for 28-day all-cause mortality of patients diagnosed with severe sepsis or septic shock.                Lactic Acid, Reflex Timer (This will reflex a " repeat order 3-3:15 hours after ordered.) [[id number]] Collected:  01/21/19 1948    Specimen:  Blood Updated:  01/21/19 2034    CBC & Differential [[id number]] Collected:  01/21/19 2053    Specimen:  Blood Updated:  01/21/19 2145    Narrative:       The following orders were created for panel order CBC & Differential.  Procedure                               Abnormality         Status                     ---------                               -----------         ------                     Scan Slide[[id number]]                   Normal              Final result               CBC Auto Differential[[id number]]        Abnormal            Final result                 Please view results for these tests on the individual orders.    D-dimer, Quantitative [[id number]]  (Abnormal) Collected:  01/21/19 2053    Specimen:  Blood Updated:  01/21/19 2122     D-Dimer, Quantitative 1.67 MCGFEU/mL     Narrative:       The Stago D-Dimer test used in conjunction with a clinical pretest probability (PTP) assessment model, has been approved by the FDA to rule out the presence of venous thromboembolism (VTE) in outpatients suspected of deep venous thrombosis (DVT) or pulmonary embolism (PE).     CBC Auto Differential [[id number]]  (Abnormal) Collected:  01/21/19 2053    Specimen:  Blood Updated:  01/21/19 2145     WBC 10.82 10*3/mm3      RBC 4.64 10*6/mm3      Hemoglobin 14.5 g/dL      Hematocrit 42.6 %      MCV 91.8 fL      MCH 31.3 pg      MCHC 34.0 g/dL      RDW 14.2 %      RDW-SD 47.9 fl      MPV 13.7 fL      Platelets 136 10*3/mm3      Neutrophil % 88.2 %      Lymphocyte % 3.2 %      Monocyte % 8.4 %      Eosinophil % 0.1 %      Basophil % 0.1 %      Immature Grans % 0.3 %      Neutrophils, Absolute 9.54 10*3/mm3      Lymphocytes, Absolute 0.35 10*3/mm3      Monocytes, Absolute 0.91 10*3/mm3      Eosinophils, Absolute 0.01 10*3/mm3      Basophils, Absolute 0.01 10*3/mm3      Immature Grans, Absolute 0.03 10*3/mm3     Blood Culture -  Blood, Arm, Left [[id number]] Collected:  01/21/19 2053    Specimen:  Blood from Arm, Left Updated:  01/21/19 2104    Scan Slide [[id number]]  (Normal) Collected:  01/21/19 2053    Specimen:  Blood Updated:  01/21/19 2145     RBC Morphology Normal     WBC Morphology Normal     Platelet Morphology Normal          I ordered the above labs and reviewed the results    RADIOLOGY  XR Chest 2 View   Final Result   Pneumonia in the right middle lobe and probably also in the   right lower lobe medially.       This report was finalized on 1/21/2019 6:53 PM by Dr. Mitul Suarez M.D.               I ordered the above noted radiological studies. Interpreted by radiologist. Reviewed by me in PACS.       PROCEDURES  Procedures    EKG          EKG time: 1800  Rhythm/Rate: sinus bradycardia, 59  P waves and DC: normal  QRS, axis: normal   ST and T waves: normal     Interpreted Contemporaneously by me, independently viewed  unchanged compared to prior 8/15/2009      PROGRESS AND CONSULTS      1759 - CXR, EKG and lab work ordered for further evaluation.    1848 - Rechecked pt. Pt is resting comfortably in NAD. Informed pt and wife of the results of his unremarkable EKG and his CXR which is concerning for RLL PNA. Informed them of the plans for admission.    1849 - Blood cultures and lactic acid level ordered for further evaluation.     1859 - IVF, rocephin and zithromax ordered.     2001 - Rechecked pt. Pt is resting comfortably in NAD. Pt has been a difficult stick so far. Evaluated the pt for femoral stick if IV therapy is unsuccessful.     2031 - Rechecked pt. Pt has not had sufficient blood drawn for lab results and a previously collected specimen was unable to be used. Performed femoral stick at this time.     2034 - Per RN, the lactic acid level was able to be run, and was 2.7. Informed pt of this result. Restated the pans for admission. Pt understands and agrees with plan. All questions answered.     2150 - Discussed pt care  with Dr. Simmons (Pulm) who agrees to admit the pt. Would like us to give patient Lovenox to cover possible PE.  Will perform V/Q scan and venous dopplers tomorrow morning.  We suspect that patient has aspiration pneumonia.    2155 - Updated patient about diagnosis and results.  He and his wife understand the plan and the need for antibiotics and lovenox at this time.    MEDICAL DECISION MAKING  Results were reviewed/discussed with the patient and they were also made aware of online access. Pt also made aware that some labs, such as cultures, will not be resulted during ER visit and follow up with PMD is necessary.     MDM  Number of Diagnoses or Management Options     Amount and/or Complexity of Data Reviewed  Clinical lab tests: ordered and reviewed (Lactic acid - 2.7)  Tests in the radiology section of CPT®: ordered and reviewed (CXR - Pneumonia in the right middle lobe and probably also in the right lower lobe medially.)  Tests in the medicine section of CPT®: ordered and reviewed (See EKG procedure note.)  Independent visualization of images, tracings, or specimens: yes           DIAGNOSIS  Final diagnoses:   Aspiration pneumonia of right middle lobe, unspecified aspiration pneumonia type (CMS/HCC)   Sepsis, due to unspecified organism (CMS/HCC)   Elevated d-dimer       DISPOSITION  ADMISSION    Discussed treatment plan and reason for admission with pt/family and admitting physician.  Pt/family voiced understanding of the plan for admission for further testing/treatment as needed.     Latest Documented Vital Signs:  As of 9:59 PM  BP- 130/64 HR- 81 Temp- 98.6 °F (37 °C) (Oral) O2 sat- 92%    --  Documentation assistance provided by bo Shields for Dr. Hahn.  Information recorded by the scrjuane was done at my direction and has been verified and validated by me.       Martin Shields  01/21/19 2131       Chidi Hahn MD  01/21/19 2201

## 2019-01-22 ENCOUNTER — ANESTHESIA (OUTPATIENT)
Dept: GASTROENTEROLOGY | Facility: HOSPITAL | Age: 74
End: 2019-01-22

## 2019-01-22 ENCOUNTER — ANESTHESIA EVENT (OUTPATIENT)
Dept: GASTROENTEROLOGY | Facility: HOSPITAL | Age: 74
End: 2019-01-22

## 2019-01-22 ENCOUNTER — APPOINTMENT (OUTPATIENT)
Dept: CT IMAGING | Facility: HOSPITAL | Age: 74
End: 2019-01-22

## 2019-01-22 PROBLEM — T17.908A ASPIRATION OF FOREIGN BODY: Status: ACTIVE | Noted: 2019-01-21

## 2019-01-22 LAB
ANION GAP SERPL CALCULATED.3IONS-SCNC: 11.6 MMOL/L
APPEARANCE FLD: ABNORMAL
BUN BLD-MCNC: 16 MG/DL (ref 8–23)
BUN/CREAT SERPL: 20 (ref 7–25)
CALCIUM SPEC-SCNC: 8.2 MG/DL (ref 8.6–10.5)
CHLORIDE SERPL-SCNC: 108 MMOL/L (ref 98–107)
CO2 SERPL-SCNC: 20.4 MMOL/L (ref 22–29)
COLOR FLD: ABNORMAL
CREAT BLD-MCNC: 0.8 MG/DL (ref 0.76–1.27)
D-LACTATE SERPL-SCNC: 1.2 MMOL/L (ref 0.5–2)
D-LACTATE SERPL-SCNC: 1.4 MMOL/L (ref 0.5–2)
D-LACTATE SERPL-SCNC: 1.4 MMOL/L (ref 0.5–2)
D-LACTATE SERPL-SCNC: 2 MMOL/L (ref 0.5–2)
DEPRECATED RDW RBC AUTO: 48.1 FL (ref 37–54)
ERYTHROCYTE [DISTWIDTH] IN BLOOD BY AUTOMATED COUNT: 14.2 % (ref 11.5–14.5)
GFR SERPL CREATININE-BSD FRML MDRD: 95 ML/MIN/1.73
GLUCOSE BLD-MCNC: 131 MG/DL (ref 65–99)
HCT VFR BLD AUTO: 40.7 % (ref 40.4–52.2)
HGB BLD-MCNC: 13.3 G/DL (ref 13.7–17.6)
LYMPHOCYTES NFR FLD MANUAL: 7 %
MCH RBC QN AUTO: 30.4 PG (ref 27–32.7)
MCHC RBC AUTO-ENTMCNC: 32.7 G/DL (ref 32.6–36.4)
MCV RBC AUTO: 92.9 FL (ref 79.8–96.2)
MONOS+MACROS NFR FLD: 6 %
NEUTROPHILS NFR FLD MANUAL: 87 %
OTHER CELLS FLUID PER 100/WBCS: 9 /100 WBCS
PLATELET # BLD AUTO: 134 10*3/MM3 (ref 140–500)
PMV BLD AUTO: 12.4 FL (ref 6–12)
POTASSIUM BLD-SCNC: 3.8 MMOL/L (ref 3.5–5.2)
RBC # BLD AUTO: 4.38 10*6/MM3 (ref 4.6–6)
RBC # FLD AUTO: 3910 /MM3
SODIUM BLD-SCNC: 140 MMOL/L (ref 136–145)
WBC # FLD AUTO: 970 /MM3
WBC NRBC COR # BLD: 19.09 10*3/MM3 (ref 4.5–10.7)

## 2019-01-22 PROCEDURE — 71250 CT THORAX DX C-: CPT

## 2019-01-22 PROCEDURE — 88112 CYTOPATH CELL ENHANCE TECH: CPT | Performed by: INTERNAL MEDICINE

## 2019-01-22 PROCEDURE — 25010000002 PROPOFOL 1000 MG/ML EMULSION: Performed by: ANESTHESIOLOGY

## 2019-01-22 PROCEDURE — 80048 BASIC METABOLIC PNL TOTAL CA: CPT | Performed by: INTERNAL MEDICINE

## 2019-01-22 PROCEDURE — 25010000002 ENOXAPARIN PER 10 MG: Performed by: INTERNAL MEDICINE

## 2019-01-22 PROCEDURE — 87205 SMEAR GRAM STAIN: CPT | Performed by: INTERNAL MEDICINE

## 2019-01-22 PROCEDURE — 25010000002 PIPERACILLIN SOD-TAZOBACTAM PER 1 G: Performed by: INTERNAL MEDICINE

## 2019-01-22 PROCEDURE — 88305 TISSUE EXAM BY PATHOLOGIST: CPT | Performed by: INTERNAL MEDICINE

## 2019-01-22 PROCEDURE — 83605 ASSAY OF LACTIC ACID: CPT | Performed by: EMERGENCY MEDICINE

## 2019-01-22 PROCEDURE — 0B9D8ZX DRAINAGE OF RIGHT MIDDLE LUNG LOBE, VIA NATURAL OR ARTIFICIAL OPENING ENDOSCOPIC, DIAGNOSTIC: ICD-10-PCS | Performed by: INTERNAL MEDICINE

## 2019-01-22 PROCEDURE — 83605 ASSAY OF LACTIC ACID: CPT | Performed by: INTERNAL MEDICINE

## 2019-01-22 PROCEDURE — 25010000002 PROPOFOL 10 MG/ML EMULSION: Performed by: ANESTHESIOLOGY

## 2019-01-22 PROCEDURE — 87071 CULTURE AEROBIC QUANT OTHER: CPT | Performed by: INTERNAL MEDICINE

## 2019-01-22 PROCEDURE — 0B9F8ZX DRAINAGE OF RIGHT LOWER LUNG LOBE, VIA NATURAL OR ARTIFICIAL OPENING ENDOSCOPIC, DIAGNOSTIC: ICD-10-PCS | Performed by: INTERNAL MEDICINE

## 2019-01-22 PROCEDURE — 85027 COMPLETE CBC AUTOMATED: CPT | Performed by: INTERNAL MEDICINE

## 2019-01-22 PROCEDURE — 89051 BODY FLUID CELL COUNT: CPT | Performed by: INTERNAL MEDICINE

## 2019-01-22 RX ORDER — LIDOCAINE HYDROCHLORIDE 10 MG/ML
INJECTION, SOLUTION EPIDURAL; INFILTRATION; INTRACAUDAL; PERINEURAL AS NEEDED
Status: DISCONTINUED | OUTPATIENT
Start: 2019-01-22 | End: 2019-01-22 | Stop reason: HOSPADM

## 2019-01-22 RX ORDER — LIDOCAINE HYDROCHLORIDE 20 MG/ML
INJECTION, SOLUTION EPIDURAL; INFILTRATION; INTRACAUDAL; PERINEURAL AS NEEDED
Status: DISCONTINUED | OUTPATIENT
Start: 2019-01-22 | End: 2019-01-22 | Stop reason: HOSPADM

## 2019-01-22 RX ORDER — SODIUM CHLORIDE, SODIUM LACTATE, POTASSIUM CHLORIDE, CALCIUM CHLORIDE 600; 310; 30; 20 MG/100ML; MG/100ML; MG/100ML; MG/100ML
30 INJECTION, SOLUTION INTRAVENOUS CONTINUOUS PRN
Status: DISCONTINUED | OUTPATIENT
Start: 2019-01-22 | End: 2019-01-23 | Stop reason: HOSPADM

## 2019-01-22 RX ORDER — PREDNISONE 20 MG/1
40 TABLET ORAL
Status: DISCONTINUED | OUTPATIENT
Start: 2019-01-23 | End: 2019-01-23 | Stop reason: HOSPADM

## 2019-01-22 RX ORDER — AMOXICILLIN AND CLAVULANATE POTASSIUM 875; 125 MG/1; MG/1
1 TABLET, FILM COATED ORAL EVERY 12 HOURS SCHEDULED
Status: DISCONTINUED | OUTPATIENT
Start: 2019-01-23 | End: 2019-01-23 | Stop reason: HOSPADM

## 2019-01-22 RX ORDER — LIDOCAINE HYDROCHLORIDE 20 MG/ML
INJECTION, SOLUTION INFILTRATION; PERINEURAL AS NEEDED
Status: DISCONTINUED | OUTPATIENT
Start: 2019-01-22 | End: 2019-01-22 | Stop reason: SURG

## 2019-01-22 RX ORDER — ALPHA LIPOIC ACID 300 MG
CAPSULE ORAL
COMMUNITY
End: 2019-05-09

## 2019-01-22 RX ORDER — PROPOFOL 10 MG/ML
VIAL (ML) INTRAVENOUS AS NEEDED
Status: DISCONTINUED | OUTPATIENT
Start: 2019-01-22 | End: 2019-01-22 | Stop reason: SURG

## 2019-01-22 RX ADMIN — TAZOBACTAM SODIUM AND PIPERACILLIN SODIUM 3.38 G: 375; 3 INJECTION, SOLUTION INTRAVENOUS at 01:31

## 2019-01-22 RX ADMIN — PROPOFOL 200 MG: 10 INJECTION, EMULSION INTRAVENOUS at 12:56

## 2019-01-22 RX ADMIN — LIDOCAINE HYDROCHLORIDE 100 MG: 20 INJECTION, SOLUTION INFILTRATION; PERINEURAL at 12:56

## 2019-01-22 RX ADMIN — SODIUM CHLORIDE, POTASSIUM CHLORIDE, SODIUM LACTATE AND CALCIUM CHLORIDE 30 ML/HR: 600; 310; 30; 20 INJECTION, SOLUTION INTRAVENOUS at 12:27

## 2019-01-22 RX ADMIN — SODIUM CHLORIDE, PRESERVATIVE FREE 3 ML: 5 INJECTION INTRAVENOUS at 21:07

## 2019-01-22 RX ADMIN — ENOXAPARIN SODIUM 40 MG: 40 INJECTION SUBCUTANEOUS at 21:07

## 2019-01-22 RX ADMIN — DEXTROSE MONOHYDRATE, SODIUM CHLORIDE, SODIUM LACTATE, POTASSIUM CHLORIDE, CALCIUM CHLORIDE 250 ML/HR: 5; 600; 310; 179; 20 INJECTION, SOLUTION INTRAVENOUS at 22:03

## 2019-01-22 RX ADMIN — DEXTROSE MONOHYDRATE, SODIUM CHLORIDE, SODIUM LACTATE, POTASSIUM CHLORIDE, CALCIUM CHLORIDE 150 ML/HR: 5; 600; 310; 179; 20 INJECTION, SOLUTION INTRAVENOUS at 14:02

## 2019-01-22 RX ADMIN — SODIUM CHLORIDE, PRESERVATIVE FREE 3 ML: 5 INJECTION INTRAVENOUS at 01:32

## 2019-01-22 RX ADMIN — TAZOBACTAM SODIUM AND PIPERACILLIN SODIUM 3.38 G: 375; 3 INJECTION, SOLUTION INTRAVENOUS at 14:02

## 2019-01-22 RX ADMIN — PROPOFOL 200 MCG/KG/MIN: 10 INJECTION, EMULSION INTRAVENOUS at 13:00

## 2019-01-22 RX ADMIN — DEXTROSE MONOHYDRATE, SODIUM CHLORIDE, SODIUM LACTATE, POTASSIUM CHLORIDE, CALCIUM CHLORIDE 150 ML/HR: 5; 600; 310; 179; 20 INJECTION, SOLUTION INTRAVENOUS at 01:15

## 2019-01-22 RX ADMIN — TAZOBACTAM SODIUM AND PIPERACILLIN SODIUM 3.38 G: 375; 3 INJECTION, SOLUTION INTRAVENOUS at 06:49

## 2019-01-22 RX ADMIN — TAZOBACTAM SODIUM AND PIPERACILLIN SODIUM 3.38 G: 375; 3 INJECTION, SOLUTION INTRAVENOUS at 22:22

## 2019-01-22 RX ADMIN — SODIUM CHLORIDE, POTASSIUM CHLORIDE, SODIUM LACTATE AND CALCIUM CHLORIDE 500 ML: 600; 310; 30; 20 INJECTION, SOLUTION INTRAVENOUS at 05:34

## 2019-01-22 RX ADMIN — SODIUM CHLORIDE, POTASSIUM CHLORIDE, SODIUM LACTATE AND CALCIUM CHLORIDE 500 ML: 600; 310; 30; 20 INJECTION, SOLUTION INTRAVENOUS at 19:35

## 2019-01-22 NOTE — PLAN OF CARE
Problem: Patient Care Overview  Goal: Individualization and Mutuality  Outcome: Ongoing (interventions implemented as appropriate)   01/22/19 0424   Individualization   Patient Specific Preferences Likes to be called Sohail   Patient Specific Goals (Include Timeframe) to identify what pt aspirated on   Patient Specific Interventions PRN pain meds, antibx, IV fluids       Problem: Pain, Acute (Adult)  Goal: Identify Related Risk Factors and Signs and Symptoms  Outcome: Outcome(s) achieved Date Met: 01/22/19    Goal: Acceptable Pain Control/Comfort Level  Outcome: Ongoing (interventions implemented as appropriate)

## 2019-01-22 NOTE — H&P
Patient Care Team:  Sam Tyson MD as PCP - General  Kirs Domingo MD as Consulting Physician (Urology)  Ravi Turner MD as Consulting Physician (Dermatology)  Jessika Escobar MD as Consulting Physician (Ophthalmology)      Subjective     Patient is a 73 y.o. male.  I'm asked to admit for probable aspiration pneumonia.  Patient with his usual state of good health he went for his morning 3 mile walk today he was a little ache and started he came in around 1045 to eat his breakfast he normally takes about 5 or 6 nutritional supplements at that time.  He took a handful of amend took a little drinking water and the largest a thinks of the pills sort of got stuck he couldn't get it down he had to drink more it took him a while he was coughing in sort of gagging and had trouble getting it down eventually seemed to pass he did lab little cough after that wasn't getting anything up.  Had some what he would describe as heartburn in the middle of his chest for about 2 hours after that he didn't eat he laid down to rest his wife woke him up and said you need to go to the hospital and get this checked out which he did.  He has no history of lung disease he did smoke for about 25 years very lightly uses about 1 pack a week.  He does exercise daily doesn't have any normal problems with cough or shortness of breath.  He's not had any wheezing.  He's not had any fevers or chills with this.  Normally have problems swallowing not coughed up any pill fragments that he is aware of he has coughed up a small amount of sputum that was in the ER and he said he didn't have any worse.  So he just while that he doesn't know what it looks like.      Review of Systems:  No history of seizure strokes no recent headaches or visual changes no leg and he didn't have difficulty swelling no history of bad reflux disease no liver disease or hepatitis no ulcers no melena or hematochezia.  No history of heart  disease hypertension or hyperlipidemia no palpitations.  No kidney disease dysuria hematuria and urgency or frequency does have a history of prostate carcinoma he had radiation therapy and hormone therapy for about the 3 year and there's been no evidence of active disease now for several years.  His last 2 annual PSAs a been 0.02.  No history of blood clots easy bleeding or bruising no recent lower extremity pain or swelling.  No diabetes or thyroid disease no polyuria polydipsia heat or cold intolerances    History  Past Medical History:   Diagnosis Date   • Blood in stool    • Prostate cancer (CMS/HCC)    • Skin infection    • Tinnitus      Past Surgical History:   Procedure Laterality Date   • COLONOSCOPY  02/2011    Within Normal Limits. Dr. Lema. 03/2007- 9mm polyps   • EYE ENUCLEATION     • PILONIDAL CYSTECTOMY     • WRIST SURGERY       Social History     Socioeconomic History   • Marital status:      Spouse name: Not on file   • Number of children: Not on file   • Years of education: Not on file   • Highest education level: Not on file   Tobacco Use   • Smoking status: Former Smoker     Types: Cigarettes   • Smokeless tobacco: Never Used   • Tobacco comment: Quit in the 1980s   Substance and Sexual Activity   • Alcohol use: Yes     Comment: occasional   • Drug use: No   • Sexual activity: No     Partners: Female   Social History Narrative    Exercises somewhat regularly; stationary bike and rowing machine.     Family History   Problem Relation Age of Onset   • Emphysema Mother    • COPD Mother    • Lung cancer Father    • Cancer Sister         Uterine         Allergies:  Patient has no known allergies.    Medications:  Prior to Admission medications    Medication Sig Start Date End Date Taking? Authorizing Provider   Calcium Carbonate-Vit D-Min (CALCIUM 1200 PO) Take 3,000 Units by mouth.   Yes Teressa Doan MD   aspirin 81 MG tablet Take 81 mg by mouth daily.    Teressa Doan MD  "  ciclopirox (LOPROX) 1 % shampoo  11/28/15   Teressa Doan MD   fluocinolone (SYNALAR) 0.01 % external solution as needed. 6/3/16   Teressa Doan MD   fluticasone (FLONASE) 50 MCG/ACT nasal spray 2 sprays into each nostril Daily. 11/21/17   Everton Swartz MD   ketoconazole (NIZORAL) 2 % cream Apply  topically Daily.    Provider, MD Teressa             Objective     Vital Signs  Vital Sign Min/Max for last 24 hours  Temp  Min: 97 °F (36.1 °C)  Max: 98.6 °F (37 °C)   BP  Min: 109/60  Max: 137/72   Pulse  Min: 79  Max: 88   Resp  Min: 16  Max: 18   SpO2  Min: 92 %  Max: 99 %   No Data Recorded   Weight  Min: 123 kg (270 lb 8.1 oz)  Max: 125 kg (276 lb 3 oz)       Intake/Output Summary (Last 24 hours) at 1/21/2019 2300  Last data filed at 1/21/2019 2215  Gross per 24 hour   Intake 1310 ml   Output --   Net 1310 ml     I/O this shift:  In: 1310 [IV Piggyback:1310]  Out: -   Last Weight and Admission Weight        01/21/19  2246   Weight: 125 kg (276 lb 3 oz)     Flowsheet Rows      First Filed Value   Admission Height  177.8 cm (70\") Documented at 01/21/2019 1746   Admission Weight  123 kg (270 lb 8.1 oz) Documented at 01/21/2019 2157          Body mass index is 39.63 kg/m².           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  Nasal septum midline  Neck: No adenopathy or thyromegaly no jugular venous distention trachea midline  Lungs: He is got coarse rales in the lower one half of the right chest no dullness on percussion chest expansion is symmetric and nonlabored left chest is clear  Cardiac: Regular rate and rhythm no murmur  Abdomen: Soft nontender no palpable organomegaly or masses  : Not examined  Musculoskeletal: Grossly " normal there's no lower extremity swelling no pain on palpation with No palpable cords no pain with dorsiflexion of the feet  Skin: No jaundice no petechiae no rashes noted  Neuro: He is alert oriented cooperative following commands  Extremities/P Vascular: No clubbing no cyanosis no edema palpable radial and dorsalis pedis pulses bilaterally  MSE: Chris cooperative gentleman      Labs:  Results from last 7 days   Lab Units 01/21/19 1948   GLUCOSE mg/dL 102*   SODIUM mmol/L 138   POTASSIUM mmol/L 4.1   CO2 mmol/L 16.7*   CHLORIDE mmol/L 104   ANION GAP mmol/L 17.3   CREATININE mg/dL 0.76   BUN mg/dL 17   BUN / CREAT RATIO  22.4   CALCIUM mg/dL 8.8   EGFR IF NONAFRICN AM mL/min/1.73 101   ALK PHOS U/L 91   TOTAL PROTEIN g/dL 6.9   ALT (SGPT) U/L 24   AST (SGOT) U/L 31   BILIRUBIN mg/dL 0.7   ALBUMIN g/dL 3.90   GLOBULIN gm/dL 3.0     Estimated Creatinine Clearance: 110.7 mL/min (by C-G formula based on SCr of 0.76 mg/dL).      Results from last 7 days   Lab Units 01/21/19 2053   WBC 10*3/mm3 10.82*   RBC 10*6/mm3 4.64   HEMOGLOBIN g/dL 14.5   HEMATOCRIT % 42.6   MCV fL 91.8   MCH pg 31.3   MCHC g/dL 34.0   RDW % 14.2   RDW-SD fl 47.9   MPV fL 13.7*   PLATELETS 10*3/mm3 136*   NEUTROPHIL % % 88.2*   LYMPHOCYTE % % 3.2*   MONOCYTES % % 8.4   EOSINOPHIL % % 0.1*   BASOPHIL % % 0.1   IMM GRAN % % 0.3   NEUTROS ABS 10*3/mm3 9.54*   LYMPHS ABS 10*3/mm3 0.35*   MONOS ABS 10*3/mm3 0.91   EOS ABS 10*3/mm3 0.01   BASOS ABS 10*3/mm3 0.01   IMMATURE GRANS (ABS) 10*3/mm3 0.03         Results from last 7 days   Lab Units 01/21/19 1948   TROPONIN T ng/mL <0.010             Results from last 7 days   Lab Units 01/21/19 1948   LACTATE mmol/L 2.7*   PROCALCITONIN ng/mL 0.18         Microbiology Results (last 10 days)     ** No results found for the last 240 hours. **            Diagnostics:  Xr Chest 2 View    Result Date: 1/21/2019  PA AND LATERAL CHEST X-RAY  HISTORY: Chest pain.  4 views of the chest are provided.  FINDINGS:  There is infiltrate in the right middle lobe probably in the medial right lower lobe. Cardiomediastinal silhouette is normal. The left lung is clear except for several calcified granulomas. There is no pleural effusion.      Pneumonia in the right middle lobe and probably also in the right lower lobe medially.  This report was finalized on 1/21/2019 6:53 PM by Dr. Mitul Suarez M.D.           Chest x-ray reviewed there is indeed infiltrate right midlung lower lobe.  EKG sinus rhythm some APCs noted    Assessment/Plan     1. Pneumonia probable aspiration I think the main question is does he have a retained pill fragment in his airway.  He probably warrants a bronchoscopy have discussed this with him I'm going to make him nothing by mouth I will have one of my partners see him in the morning to reassess for possible bronchoscopy.  I'm going to cover him with Zosyn for aspiration pneumonia this could just be a pneumonitis.  Time will tell.  He did have an elevated d-dimer there was some concern about PE in the emergency room I think this is extremely unlikely this gentleman was perfectly well he had a clear aspiration event and has a clear infiltrate I am not going to continue anticoagulation.  I will not pursue further workup of blood clots        Axel Simmons MD  01/21/19  11:00 PM    Time:

## 2019-01-22 NOTE — ANESTHESIA PREPROCEDURE EVALUATION
Anesthesia Evaluation     Patient summary reviewed and Nursing notes reviewed   NPO Solid Status: > 8 hours  NPO Liquid Status: > 8 hours           Airway   Mallampati: II  TM distance: >3 FB  Neck ROM: full  no difficulty expected  Dental - normal exam     Pulmonary - normal exam   (+) pneumonia , a smoker Former,   Cardiovascular - normal exam        Neuro/Psych  GI/Hepatic/Renal/Endo    (+)  GI bleeding,     Musculoskeletal     Abdominal  - normal exam   Substance History      OB/GYN          Other      history of cancer remission                    Anesthesia Plan    ASA 3     general     intravenous induction   Anesthetic plan, all risks, benefits, and alternatives have been provided, discussed and informed consent has been obtained with: patient.

## 2019-01-22 NOTE — PLAN OF CARE
Problem: Patient Care Overview  Goal: Plan of Care Review  Outcome: Ongoing (interventions implemented as appropriate)   01/22/19 1601   Coping/Psychosocial   Plan of Care Reviewed With patient;spouse   Plan of Care Review   Progress no change   OTHER   Outcome Summary Pt doing well today, had chest CT and bronchoscopy. IVF adn abx per order. Denies pain, nausea, difficulty swallowing. Continue to monitor.        Problem: Pain, Acute (Adult)  Goal: Acceptable Pain Control/Comfort Level  Outcome: Ongoing (interventions implemented as appropriate)

## 2019-01-22 NOTE — ED NOTES
Spoke to lab, IVT able to obtain iv access, no blood return.      Liliane Contreras, RN  01/21/19 1515

## 2019-01-22 NOTE — ED NOTES
Femoral stick rt femoral artery by Dr. Hahn for blood collection.  Pt tolerated well.  Lab called to inform them of the recollect for any necessary labs.     Peace Owen RN  01/21/19 1690

## 2019-01-22 NOTE — PROGRESS NOTES
"                                              LOS: 1 day   Patient Care Team:  Sam Tyson MD as PCP - General  Kris Domingo MD as Consulting Physician (Urology)  Ravi Turner MD as Consulting Physician (Dermatology)  Jessika Escobar MD as Consulting Physician (Ophthalmology)    Chief Complaint:  F/up aspiration pneumonia/pneumonitis, cough    Subjective   Interval History  I reviewed the admission note, PMH, PSH, Family hx, social history, imagings and prior records on this admission, summarized the finding in my note and formulated a transition of care plan.     Mild cough without sputum. No chest pain.     REVIEW OF SYSTEMS:   CARDIOVASCULAR: No chest pain, chest pressure or chest discomfort. No palpitations or edema.   GASTROINTESTINAL: No anorexia, nausea, vomiting or diarrhea. No abdominal pain or blood.       Ventilator/Non-Invasive Ventilation Settings (From admission, onward)    None                Physical Exam:     Vital Signs  Temp:  [97 °F (36.1 °C)-98.6 °F (37 °C)] 97.9 °F (36.6 °C)  Heart Rate:  [73-88] 73  Resp:  [16-18] 16  BP: ()/(46-75) 106/51    Intake/Output Summary (Last 24 hours) at 1/22/2019 1040  Last data filed at 1/22/2019 0440  Gross per 24 hour   Intake 1310 ml   Output --   Net 1310 ml     Flowsheet Rows      First Filed Value   Admission Height  177.8 cm (70\") Documented at 01/21/2019 1746   Admission Weight  123 kg (270 lb 8.1 oz) Documented at 01/21/2019 2157          General Appearance:    Alert, cooperative, in no acute distress   HEENT:  Mallampati score 3, moist mucous membrane   Neck:   Large. Trachea midline. No thyromegaly.   Lungs:     Coarse on the right. No wheezing or crackles. Non labored breathing    Heart:    Regular rhythm and normal rate, normal S1 and S2, no            murmur   Skin:    No abnormalities observed   Abdomen:     Obese. Soft. No tenderness. No HSM.   Neuro:   Conscious, alert, oriented x3   Extremities:   Moves all " extremities well, no edema, no cyanosis, no             Redness          Results Review:        Results from last 7 days   Lab Units 01/22/19  0556 01/21/19  1948   SODIUM mmol/L 140 138   POTASSIUM mmol/L 3.8 4.1   CHLORIDE mmol/L 108* 104   CO2 mmol/L 20.4* 16.7*   BUN mg/dL 16 17   CREATININE mg/dL 0.80 0.76   GLUCOSE mg/dL 131* 102*   CALCIUM mg/dL 8.2* 8.8     Results from last 7 days   Lab Units 01/21/19  1948   TROPONIN T ng/mL <0.010     Results from last 7 days   Lab Units 01/22/19  0556 01/21/19 2053   WBC 10*3/mm3 19.09* 10.82*   HEMOGLOBIN g/dL 13.3* 14.5   HEMATOCRIT % 40.7 42.6   PLATELETS 10*3/mm3 134* 136*               I reviewed the patient's new clinical results.  I personally viewed and interpreted the patient's CXR        Medication Review:     enoxaparin 40 mg Subcutaneous Q24H   piperacillin-tazobactam 3.375 g Intravenous Q8H   sodium chloride 3 mL Intravenous Q12H         dextrose 5% lactated Ringer's with KCl 20 mEq/L 150 mL/hr Last Rate: 150 mL/hr (01/22/19 0115)       Diagnostic imaging:  I personally and independently reviewed the following images:  Granulomatous lung disease.  Right middle lobe and lower lobe consolidation.  No clear evidence of foreign body      Assessment   1. Aspiration pneumonitis rather than pneumonia, s/p bronch with BAL 1/22/19  2. Cough, 2ary to above  3. Leucocytosis  4. Low serum bicarbonate, suspect hyperventilation      Plan:  · Switch AB to Augmentin. Discontinue after 5 days if BAL -ve  · Start Prednisone for inflammation supported by the neutrophilic BAL  · PRN Mucinex D for cough  · Check CBC in AM    Discussed with patient before bronch. And discussed the bronchoscopy with his wife afterward.     Donnie Mcwilliams MD  01/22/19  10:40 AM            This note was dictated utilizing Dragon dictation

## 2019-01-22 NOTE — ANESTHESIA PROCEDURE NOTES
Airway  Date/Time: 1/22/2019 12:58 PM  End Time:1/22/2019 12:58 PM  Airway not difficult    General Information and Staff    Patient location during procedure: OR  Anesthesiologist: Mitul Castellanos MD  CRNA: Maryanne Montana CRNA    Indications and Patient Condition  Indications for airway management: airway protection    Preoxygenated: yes  MILS maintained throughout  Mask difficulty assessment: 1 - vent by mask    Final Airway Details  Final airway type: supraglottic airway      Successful airway: classic  Size 5  Cuff Pressure (cm H2O): 5  Airway Seal Pressure (cm H2O): 5    Number of attempts at approach: 1

## 2019-01-22 NOTE — PLAN OF CARE
Problem: Patient Care Overview  Goal: Plan of Care Review   01/22/19 0424 01/22/19 0428   Coping/Psychosocial   Plan of Care Reviewed With --  patient   Plan of Care Review   Progress --  no change   OTHER   Outcome Summary pt came in from the ER with aspiration pneumonia, pt is on IV fluids and antibx, slept well tonight, NPO for bronchoscopy done today. will continue to monitor and treat per MDs orders --

## 2019-01-22 NOTE — ED NOTES
"Nursing report ED to floor  Ramiro Osborne  73 y.o.  male    HPI (triage note):   Chief Complaint   Patient presents with   • Difficulty Swallowing     pt states \"i think i aspirated a pill\" x3 hours ago. pt reprots burning feeling in throat and persistent cough.    • Cough       Admitting doctor:   Axel Simmons MD    Admitting diagnosis:   The primary encounter diagnosis was Aspiration pneumonia of right middle lobe, unspecified aspiration pneumonia type (CMS/HCC). Diagnoses of Sepsis, due to unspecified organism (CMS/HCC) and Elevated d-dimer were also pertinent to this visit.    Code status:   Current Code Status     This patient does not have a recorded code status. Please follow your organizational policy for patients in this situation.          Allergies:   Patient has no known allergies.    Weight:       01/21/19 2157   Weight: 123 kg (270 lb 8.1 oz)       Most recent vitals:   Vitals:    01/21/19 1942 01/21/19 2104 01/21/19 2130 01/21/19 2157   BP: 131/55 130/64     Pulse: 84 81     Resp: 16      Temp:   98.6 °F (37 °C)    TempSrc:   Oral    SpO2: 94% 92%     Weight:    123 kg (270 lb 8.1 oz)   Height:           Active LDAs/IV Access:   Lines, Drains & Airways    Active LDAs     Name:   Placement date:   Placement time:   Site:   Days:    Peripheral IV 01/21/19 1916 Left;Right Wrist   01/21/19 1916    Wrist   less than 1                Labs (abnormal labs have a star):   Labs Reviewed   COMPREHENSIVE METABOLIC PANEL - Abnormal; Notable for the following components:       Result Value    Glucose 102 (*)     CO2 16.7 (*)     All other components within normal limits    Narrative:     The MDRD GFR formula is only valid for adults with stable renal function between ages 18 and 70.   D-DIMER, QUANTITATIVE - Abnormal; Notable for the following components:    D-Dimer, Quantitative 1.67 (*)     All other components within normal limits    Narrative:     The Stago D-Dimer test used in conjunction with a " "clinical pretest probability (PTP) assessment model, has been approved by the FDA to rule out the presence of venous thromboembolism (VTE) in outpatients suspected of deep venous thrombosis (DVT) or pulmonary embolism (PE).    CBC WITH AUTO DIFFERENTIAL - Abnormal; Notable for the following components:    WBC 10.82 (*)     MPV 13.7 (*)     Platelets 136 (*)     Neutrophil % 88.2 (*)     Lymphocyte % 3.2 (*)     Eosinophil % 0.1 (*)     Neutrophils, Absolute 9.54 (*)     Lymphocytes, Absolute 0.35 (*)     All other components within normal limits   LACTIC ACID, PLASMA - Abnormal; Notable for the following components:    Lactate 2.7 (*)     All other components within normal limits   TROPONIN (IN-HOUSE) - Normal    Narrative:     Troponin T Reference Ranges:  Less than 0.03 ng/mL:    Negative for AMI  0.03 to 0.09 ng/mL:      Indeterminant for AMI  Greater than 0.09 ng/mL: Positive for AMI   PROCALCITONIN - Normal    Narrative:     As a Marker for Sepsis (Non-Neonates):   1. <0.5 ng/mL represents a low risk of severe sepsis and/or septic shock.  1. >2 ng/mL represents a high risk of severe sepsis and/or septic shock.    As a Marker for Lower Respiratory Tract Infections that require antibiotic therapy:  PCT on Admission     Antibiotic Therapy             6-12 Hrs later  > 0.5                Strongly Recommended            >0.25 - <0.5         Recommended  0.1 - 0.25           Discouraged                   Remeasure/reassess PCT  <0.1                 Strongly Discouraged          Remeasure/reassess PCT      As 28 day mortality risk marker: \"Change in Procalcitonin Result\" (> 80 % or <=80 %) if Day 0 (or Day 1) and Day 4 values are available. Refer to http://www.[employer]-pct-calculator.com/   Change in PCT <=80 %   A decrease of PCT levels below or equal to 80 % defines a positive change in PCT test result representing a higher risk for 28-day all-cause mortality of patients diagnosed with severe sepsis or septic " shock.  Change in PCT > 80 %   A decrease of PCT levels of more than 80 % defines a negative change in PCT result representing a lower risk for 28-day all-cause mortality of patients diagnosed with severe sepsis or septic shock.               SCAN SLIDE - Normal   BLOOD CULTURE   BLOOD CULTURE   BLOOD CULTURE   LACTIC ACID REFLEX TIMER   CBC AND DIFFERENTIAL    Narrative:     The following orders were created for panel order CBC & Differential.  Procedure                               Abnormality         Status                     ---------                               -----------         ------                     Scan Slide[[id number]]                   Normal              Final result               CBC Auto Differential[[id number]]        Abnormal            Final result                 Please view results for these tests on the individual orders.       EKG:   ECG 12 Lead             Meds given in ED:   Medications   sodium chloride 0.9 % flush 10 mL (not administered)   lactated ringers bolus 1,000 mL (1,000 mL Intravenous New Bag 1/21/19 2137)   enoxaparin (LOVENOX) syringe 120 mg (not administered)   sodium chloride 0.9 % bolus 1,000 mL (0 mL Intravenous Stopped 1/21/19 2130)   cefTRIAXone (ROCEPHIN) IVPB 1 g (0 g Intravenous Stopped 1/21/19 2022)   azithromycin (ZITHROMAX) 500 mg 0.9% NaCl (Add-vantage) 250 mL (500 mg Intravenous New Bag 1/21/19 2029)       Imaging results:  Xr Chest 2 View    Result Date: 1/21/2019  Pneumonia in the right middle lobe and probably also in the right lower lobe medially.  This report was finalized on 1/21/2019 6:53 PM by Dr. Mitul Suarez M.D.        Ambulatory status:   Up with assist    Social issues:   Social History     Socioeconomic History   • Marital status:      Spouse name: Not on file   • Number of children: Not on file   • Years of education: Not on file   • Highest education level: Not on file   Social Needs   • Financial resource strain: Not on file   •  Food insecurity - worry: Not on file   • Food insecurity - inability: Not on file   • Transportation needs - medical: Not on file   • Transportation needs - non-medical: Not on file   Occupational History   • Not on file   Tobacco Use   • Smoking status: Former Smoker     Types: Cigarettes   • Smokeless tobacco: Never Used   • Tobacco comment: Quit in the 1980s   Substance and Sexual Activity   • Alcohol use: Yes     Comment: occasional   • Drug use: No   • Sexual activity: No     Partners: Female   Other Topics Concern   • Not on file   Social History Narrative    Exercises somewhat regularly; stationary bike and rowing machine.          Peace Owen, RN  01/21/19 6617

## 2019-01-23 VITALS
DIASTOLIC BLOOD PRESSURE: 59 MMHG | HEIGHT: 70 IN | HEART RATE: 93 BPM | SYSTOLIC BLOOD PRESSURE: 111 MMHG | RESPIRATION RATE: 20 BRPM | OXYGEN SATURATION: 98 % | BODY MASS INDEX: 39.54 KG/M2 | WEIGHT: 276.19 LBS | TEMPERATURE: 98 F

## 2019-01-23 LAB
BASOPHILS # BLD AUTO: 0.01 10*3/MM3 (ref 0–0.2)
BASOPHILS NFR BLD AUTO: 0.1 % (ref 0–1.5)
D-LACTATE SERPL-SCNC: 1.3 MMOL/L (ref 0.5–2)
D-LACTATE SERPL-SCNC: 1.3 MMOL/L (ref 0.5–2)
DEPRECATED RDW RBC AUTO: 49.3 FL (ref 37–54)
EOSINOPHIL # BLD AUTO: 0.28 10*3/MM3 (ref 0–0.7)
EOSINOPHIL NFR BLD AUTO: 2.3 % (ref 0.3–6.2)
ERYTHROCYTE [DISTWIDTH] IN BLOOD BY AUTOMATED COUNT: 14.6 % (ref 11.5–14.5)
HCT VFR BLD AUTO: 40.5 % (ref 40.4–52.2)
HGB BLD-MCNC: 13.4 G/DL (ref 13.7–17.6)
IMM GRANULOCYTES # BLD AUTO: 0.02 10*3/MM3 (ref 0–0.03)
IMM GRANULOCYTES NFR BLD AUTO: 0.2 % (ref 0–0.5)
LYMPHOCYTES # BLD AUTO: 1.25 10*3/MM3 (ref 0.9–4.8)
LYMPHOCYTES NFR BLD AUTO: 10.3 % (ref 19.6–45.3)
MCH RBC QN AUTO: 30.9 PG (ref 27–32.7)
MCHC RBC AUTO-ENTMCNC: 33.1 G/DL (ref 32.6–36.4)
MCV RBC AUTO: 93.3 FL (ref 79.8–96.2)
MONOCYTES # BLD AUTO: 0.91 10*3/MM3 (ref 0.2–1.2)
MONOCYTES NFR BLD AUTO: 7.5 % (ref 5–12)
NEUTROPHILS # BLD AUTO: 9.68 10*3/MM3 (ref 1.9–8.1)
NEUTROPHILS NFR BLD AUTO: 79.6 % (ref 42.7–76)
PLATELET # BLD AUTO: 137 10*3/MM3 (ref 140–500)
PMV BLD AUTO: 12.7 FL (ref 6–12)
RBC # BLD AUTO: 4.34 10*6/MM3 (ref 4.6–6)
WBC NRBC COR # BLD: 12.15 10*3/MM3 (ref 4.5–10.7)

## 2019-01-23 PROCEDURE — 83605 ASSAY OF LACTIC ACID: CPT | Performed by: INTERNAL MEDICINE

## 2019-01-23 PROCEDURE — 85025 COMPLETE CBC W/AUTO DIFF WBC: CPT | Performed by: INTERNAL MEDICINE

## 2019-01-23 PROCEDURE — 63710000001 PREDNISONE PER 1 MG: Performed by: INTERNAL MEDICINE

## 2019-01-23 RX ORDER — AMOXICILLIN AND CLAVULANATE POTASSIUM 875; 125 MG/1; MG/1
1 TABLET, FILM COATED ORAL EVERY 12 HOURS SCHEDULED
Qty: 7 TABLET | Refills: 0 | Status: SHIPPED | OUTPATIENT
Start: 2019-01-23 | End: 2019-01-27

## 2019-01-23 RX ORDER — PREDNISONE 10 MG/1
TABLET ORAL
Qty: 12 TABLET | Refills: 0 | Status: SHIPPED | OUTPATIENT
Start: 2019-01-24 | End: 2019-05-09

## 2019-01-23 RX ADMIN — DEXTROSE MONOHYDRATE, SODIUM CHLORIDE, SODIUM LACTATE, POTASSIUM CHLORIDE, CALCIUM CHLORIDE 250 ML/HR: 5; 600; 310; 179; 20 INJECTION, SOLUTION INTRAVENOUS at 06:14

## 2019-01-23 RX ADMIN — AMOXICILLIN AND CLAVULANATE POTASSIUM 1 TABLET: 875; 125 TABLET, FILM COATED ORAL at 07:17

## 2019-01-23 RX ADMIN — PREDNISONE 40 MG: 20 TABLET ORAL at 07:18

## 2019-01-23 RX ADMIN — SODIUM CHLORIDE, PRESERVATIVE FREE 3 ML: 5 INJECTION INTRAVENOUS at 08:32

## 2019-01-23 RX ADMIN — DEXTROSE MONOHYDRATE, SODIUM CHLORIDE, SODIUM LACTATE, POTASSIUM CHLORIDE, CALCIUM CHLORIDE 250 ML/HR: 5; 600; 310; 179; 20 INJECTION, SOLUTION INTRAVENOUS at 12:46

## 2019-01-23 RX ADMIN — DEXTROSE MONOHYDRATE, SODIUM CHLORIDE, SODIUM LACTATE, POTASSIUM CHLORIDE, CALCIUM CHLORIDE 250 ML/HR: 5; 600; 310; 179; 20 INJECTION, SOLUTION INTRAVENOUS at 02:01

## 2019-01-23 NOTE — DISCHARGE SUMMARY
DISCHARGE SUMMARY    Patient Name: Ramiro Osborne  Age/Sex: 73 y.o. male  : 1945  MRN: [id number]  Patient Care Team:  Sam Tyson MD as PCP - General  Kris Domingo MD as Consulting Physician (Urology)  Ravi Turner MD as Consulting Physician (Dermatology)  Jessika Escobar MD as Consulting Physician (Ophthalmology)       Date of Admit: 2019  Date of Discharge:  19  Discharge Condition: Good    Discharge Diagnoses:    1. Aspiration pneumonitis rather than pneumonia, s/p bronch with BAL 19  2. Cough, 2ary to above  3. Leucocytosis  4. Low serum bicarbonate, suspect hyperventilation    History of present illness from H&P from 2019: per Dr. Simmons  Patient is a 73 y.o. male.  I'm asked to admit for probable aspiration pneumonia.  Patient with his usual state of good health he went for his morning 3 mile walk today he was a little ache and started he came in around 1045 to eat his breakfast he normally takes about 5 or 6 nutritional supplements at that time.  He took a handful of amend took a little drinking water and the largest a thinks of the pills sort of got stuck he couldn't get it down he had to drink more it took him a while he was coughing in sort of gagging and had trouble getting it down eventually seemed to pass he did lab little cough after that wasn't getting anything up.  Had some what he would describe as heartburn in the middle of his chest for about 2 hours after that he didn't eat he laid down to rest his wife woke him up and said you need to go to the hospital and get this checked out which he did.  He has no history of lung disease he did smoke for about 25 years very lightly uses about 1 pack a week.  He does exercise daily doesn't have any normal problems with cough or shortness of breath.  He's not had any wheezing.  He's not had any fevers or chills with this.  Normally have problems swallowing not coughed up any pill fragments that he is  aware of he has coughed up a small amount of sputum that was in the ER and he said he didn't have any worse.  So he just while that he doesn't know what it looks like.      Hospital Course:   Patient was initially treated with Zosyn for possible aspiration.  Bronchoscopy with BAL showed finding consistent with aspiration.  BAL revealed tiny particles but there was no obvious foreign body to extract.  Patient had significant leukocytosis on presentation which improved thereafter.  He did not have fever.  He felt better after bronchoscopy, antibiotics and steroids and therefore he was discharged to complete 5 days course of antibiotics with Augmentin and a steroid taper. Will need a f/up CXR in 4-6 weeks to ensure resolution. He has features of sleep apnea and may benefits from outpatient evaluation.    Consults:   IP CONSULT TO PULMONOLOGY    Significant Discharge Diagnostics   Procedures Performed:  Procedure(s):  BRONCHOSCOPY WITH BAL OF RIGHT LOWER LOBE AND RIGHT MIDDLE LOBE COMBINED  01/22 1224 BRONCHOSCOPY    Pertinent Lab Results:  Results from last 7 days   Lab Units 01/22/19  0556 01/21/19  1948   SODIUM mmol/L 140 138   POTASSIUM mmol/L 3.8 4.1   CHLORIDE mmol/L 108* 104   CO2 mmol/L 20.4* 16.7*   BUN mg/dL 16 17   CREATININE mg/dL 0.80 0.76   GLUCOSE mg/dL 131* 102*   CALCIUM mg/dL 8.2* 8.8   AST (SGOT) U/L  --  31   ALT (SGPT) U/L  --  24     Results from last 7 days   Lab Units 01/21/19  1948   TROPONIN T ng/mL <0.010     Results from last 7 days   Lab Units 01/23/19  0601 01/22/19  0556 01/21/19 2053   WBC 10*3/mm3 12.15* 19.09* 10.82*   HEMOGLOBIN g/dL 13.4* 13.3* 14.5   HEMATOCRIT % 40.5 40.7 42.6   PLATELETS 10*3/mm3 137* 134* 136*   MCV fL 93.3 92.9 91.8   MCH pg 30.9 30.4 31.3   MCHC g/dL 33.1 32.7 34.0   RDW % 14.6* 14.2 14.2   RDW-SD fl 49.3 48.1 47.9   MPV fL 12.7* 12.4* 13.7*   NEUTROPHIL % % 79.6*  --  88.2*   LYMPHOCYTE % % 10.3*  --  3.2*   MONOCYTES % % 7.5  --  8.4   EOSINOPHIL % % 2.3  --   0.1*   BASOPHIL % % 0.1  --  0.1   IMM GRAN % % 0.2  --  0.3   NEUTROS ABS 10*3/mm3 9.68*  --  9.54*   LYMPHS ABS 10*3/mm3 1.25  --  0.35*   MONOS ABS 10*3/mm3 0.91  --  0.91   EOS ABS 10*3/mm3 0.28  --  0.01   BASOS ABS 10*3/mm3 0.01  --  0.01   IMMATURE GRANS (ABS) 10*3/mm3 0.02  --  0.03                             Results from last 7 days   Lab Units 01/23/19  0601 01/22/19  2345 01/22/19  1746 01/22/19  1150 01/22/19  0556 01/22/19  0018 01/21/19 1948   PROCALCITONIN ng/mL  --   --   --   --   --   --  0.18   LACTATE mmol/L 1.3 1.3 1.2 1.4 2.0 1.4 2.7*             Results from last 7 days   Lab Units 01/21/19 2053 01/21/19 1948   BLOODCX  No growth at 24 hours No growth at 24 hours                   Imaging Results:  Imaging Results (all)     Procedure Component Value Units Date/Time    CT Chest Without Contrast [[id number]] Collected:  01/22/19 1134     Updated:  01/23/19 0633    Narrative:       CT CHEST WITHOUT IV CONTRAST     HISTORY: Aspiration, check for foreign body.      TECHNIQUE: Radiation dose reduction techniques were utilized, including  automated exposure control and exposure modulation based on body size.   3 mm images were obtained through the chest without IV contrast.      COMPARISON: Chest radiograph 08/15/2009 and 01/21/2019.      FINDINGS:     There is no mediastinal or axillary adenopathy by size criteria.  Bilateral gynecomastia is present.     Hepatic steatosis is present. There is cholelithiasis.     The heart is normal in size.     There is pulmonary opacification and consolidation within the  inferomedial aspect of the right middle and lower lobes. Densely  calcified granuloma is present within the left upper lobe and it has  been present since 2009.     There are no suspicious lytic or blastic osseous lesions.       Impression:       Findings of pneumonia within the right middle and lower  lobes, likely related to aspiration given patient's history. No findings  of radiopaque  foreign object are visualized within the lung or bronchi.     The above findings were discussed with Dr. Mcwilliams by telephone by Twin Bullard MD at 10:55 AM on 01/22/2019.     This report was finalized on 1/23/2019 6:29 AM by Dr. Twin Bullard M.D.       XR Chest 2 View [[id number]] Collected:  01/21/19 1852     Updated:  01/21/19 1856    Narrative:       PA AND LATERAL CHEST X-RAY     HISTORY: Chest pain.     4 views of the chest are provided.     FINDINGS: There is infiltrate in the right middle lobe probably in the  medial right lower lobe. Cardiomediastinal silhouette is normal. The  left lung is clear except for several calcified granulomas. There is no  pleural effusion.       Impression:       Pneumonia in the right middle lobe and probably also in the  right lower lobe medially.     This report was finalized on 1/21/2019 6:53 PM by Dr. Mitul Suarez M.D.             Objective:   Temp:  [97.3 °F (36.3 °C)-98.6 °F (37 °C)] 98 °F (36.7 °C)  Heart Rate:  [70-93] 93  Resp:  [16-20] 20  BP: ()/(40-59) 111/59   SpO2:  [91 %-98 %] 98 %  on    Device (Oxygen Therapy): room air    Intake/Output Summary (Last 24 hours) at 1/23/2019 1726  Last data filed at 1/23/2019 1300  Gross per 24 hour   Intake 7061 ml   Output --   Net 7061 ml     Body mass index is 39.63 kg/m².      01/21/19 2157 01/21/19 2246   Weight: 123 kg (270 lb 8.1 oz) 125 kg (276 lb 3 oz)     Weight change:     Physical Exam:    General Appearance:    Alert, cooperative, in no acute distress   HEENT:  Mallampati score 3, moist mucous membrane   Neck:   Large. Trachea midline. No thyromegaly.   Lungs:     Coarse on the right. No wheezing or crackles. Non labored breathing    Heart:    Regular rhythm and normal rate, normal S1 and S2, no            murmur   Skin:    No abnormalities observed   Abdomen:     Obese. Soft. No tenderness. No HSM.   Neuro:   Conscious, alert, oriented x3   Extremities:   Moves all extremities well, no edema, no  cyanosis, no             Redness             Discharge Medications and Instructions:     Discharge Medications     Discharge Medications      New Medications      Instructions Start Date   amoxicillin-clavulanate 875-125 MG per tablet  Commonly known as:  AUGMENTIN   1 tablet, Oral, Every 12 Hours Scheduled      predniSONE 10 MG tablet  Commonly known as:  DELTASONE   Take 3 tablets daily for 2 days then 2 tablets daily for 2 days then 1 tablet daily for 2 days         Continue These Medications      Instructions Start Date   Alpha-Lipoic Acid 300 MG capsule   Oral      aspirin 81 MG tablet   81 mg, Oral, Daily      CALCIUM 1200 PO   3,000 Units, Oral      CALCIUM CITRATE PO   1,000 mg, Oral      CHELATED ZINC PO   Oral      cholecalciferol 28148 units capsule  Commonly known as:  VITAMIN D3   50,000 Units, Oral, Daily      ciclopirox 1 % shampoo  Commonly known as:  LOPROX   No dose, route, or frequency recorded.      fluocinolone 0.01 % external solution  Commonly known as:  SYNALAR   As Needed      fluticasone 50 MCG/ACT nasal spray  Commonly known as:  FLONASE   2 sprays, Nasal, Daily      ketoconazole 2 % cream  Commonly known as:  NIZORAL   Topical, Daily      L-Glutathione crystals   100 mg, Does not apply, Daily      LUTEIN-ZEAXANTHIN PO   Oral      MULTI FOR HIM 50+ PO   Oral      QUERCETIN PO   Oral      TOTAL MEMORY & FOCUS FORMULA PO   Oral      ULTIMATE BOBBI BABY PROBIOTIC POWDER   Oral             Discharge Diet:    Dietary Orders (From admission, onward)    Start     Ordered    01/23/19 0832  Diet Regular; Vegetarian; Lacto-Ovo: Allows Dairy Products & Eggs  Diet Effective Now     Question Answer Comment   Diet Texture / Consistency Regular    Common Modifiers Vegetarian    Vegetarian Level: Lacto-Ovo: Allows Dairy Products & Eggs        01/23/19 0831          Activity at Discharge:   as tolerated    Discharge disposition: Home    Discharge Instructions and Follow ups:    Follow-up Information      Sam Tyson MD .    Specialty:  Internal Medicine  Contact information:  [address] [place] [address]  [address]  [place] [address]  [phone]                 Future Appointments   Date Time Provider Department Center   4/17/2019 11:40 AM [facility] None   4/24/2019 10:00 AM Sam Tyson MD [facility] None        Medication Reconciliation: Please see electronically completed Med Rec.    Total time spent discharging patient including evaluation, medication reconciliation, arranging follow up, and post hospitalization instructions and education total time exceeds 30 minutes.     Donnie Mcwilliams MD  01/23/19  5:26 PM      Dictated utilizing Dragon dictation

## 2019-01-23 NOTE — PLAN OF CARE
Problem: Patient Care Overview  Goal: Plan of Care Review  Outcome: Ongoing (interventions implemented as appropriate)   01/23/19 0428   Coping/Psychosocial   Plan of Care Reviewed With patient   Plan of Care Review   Progress no change   OTHER   Outcome Summary Pts blood pressure has stayed in the 90 diastolic all night, Spoke with MD and stated to increase IV fluids, seems to tolerate well. pt hasnt complained of any pain or dizziness. started a regular diet and tolerated well. will continue to monitor and treat per MDs orders      Goal: Individualization and Mutuality  Outcome: Ongoing (interventions implemented as appropriate)   01/22/19 0424 01/23/19 0428   Individualization   Patient Specific Preferences Likes to be called Sohail --    Patient Specific Goals (Include Timeframe) --  to treat low blood pressure   Patient Specific Interventions --  IV fluids, antibx, Pain meds if needed        Problem: Pain, Acute (Adult)  Goal: Acceptable Pain Control/Comfort Level  Outcome: Ongoing (interventions implemented as appropriate)

## 2019-01-24 ENCOUNTER — READMISSION MANAGEMENT (OUTPATIENT)
Dept: CALL CENTER | Facility: HOSPITAL | Age: 74
End: 2019-01-24

## 2019-01-24 LAB
BACTERIA SPEC AEROBE CULT: NORMAL
CYTO UR: NORMAL
GRAM STN SPEC: NORMAL
GRAM STN SPEC: NORMAL
LAB AP CASE REPORT: NORMAL
PATH REPORT.FINAL DX SPEC: NORMAL
PATH REPORT.GROSS SPEC: NORMAL

## 2019-01-24 NOTE — PROGRESS NOTES
Case Management Discharge Note    Final Note: Discharged to home on 1/23/19 @ 18:45. ZACHERY Williamson RN, CCP.     Destination      No service has been selected for the patient.      Durable Medical Equipment      No service has been selected for the patient.      Dialysis/Infusion      No service has been selected for the patient.      Home Medical Care      No service has been selected for the patient.      Community Resources      No service has been selected for the patient.             Final Discharge Disposition Code: 01 - home or self-care

## 2019-01-24 NOTE — OUTREACH NOTE
Prep Survey      Responses   Facility patient discharged from?  [facility]   Is patient eligible?  Yes   Discharge diagnosis   aspiration pneumonia, s/p bronchoscopy   Does the patient have one of the following disease processes/diagnoses(primary or secondary)?  COPD/Pneumonia   Does the patient have Home health ordered?  No   Is there a DME ordered?  No   Prep survey completed?  Yes          Suzette Wiggins RN

## 2019-01-25 ENCOUNTER — READMISSION MANAGEMENT (OUTPATIENT)
Dept: CALL CENTER | Facility: HOSPITAL | Age: 74
End: 2019-01-25

## 2019-01-25 NOTE — OUTREACH NOTE
COPD/PN Week 1 Survey      Responses   Facility patient discharged from?  [facility]   Does the patient have one of the following disease processes/diagnoses(primary or secondary)?  COPD/Pneumonia   Is there a successful TCM telephone encounter documented?  No   Was the primary reason for admission:  Pneumonia   Week 1 attempt successful?  Yes   Call start time  1433   Revoke  Decline to participate   Call end time  1434          Loren Sutherland, RN

## 2019-01-26 LAB
BACTERIA SPEC AEROBE CULT: NORMAL
BACTERIA SPEC AEROBE CULT: NORMAL

## 2019-02-20 ENCOUNTER — TRANSCRIBE ORDERS (OUTPATIENT)
Dept: ADMINISTRATIVE | Facility: HOSPITAL | Age: 74
End: 2019-02-20

## 2019-02-20 DIAGNOSIS — C61 PROSTATE CANCER (HCC): Primary | ICD-10-CM

## 2019-02-25 ENCOUNTER — HOSPITAL ENCOUNTER (OUTPATIENT)
Dept: NUCLEAR MEDICINE | Facility: HOSPITAL | Age: 74
Discharge: HOME | End: 2019-02-25

## 2019-02-25 DIAGNOSIS — C61 PROSTATE CANCER (HCC): ICD-10-CM

## 2019-02-25 PROCEDURE — A9503 TC99M MEDRONATE: HCPCS | Performed by: UROLOGY

## 2019-02-25 PROCEDURE — 78306 BONE IMAGING WHOLE BODY: CPT

## 2019-02-25 PROCEDURE — 0 TECHNETIUM MEDRONATE KIT: Performed by: UROLOGY

## 2019-02-25 RX ORDER — TC 99M MEDRONATE 20 MG/10ML
19 INJECTION, POWDER, LYOPHILIZED, FOR SOLUTION INTRAVENOUS
Status: COMPLETED | OUTPATIENT
Start: 2019-02-25 | End: 2019-02-25

## 2019-02-25 RX ADMIN — Medication 19 MILLICURIE: at 11:20

## 2019-05-09 ENCOUNTER — OFFICE VISIT (OUTPATIENT)
Dept: FAMILY MEDICINE CLINIC | Facility: CLINIC | Age: 74
End: 2019-05-09

## 2019-05-09 VITALS
OXYGEN SATURATION: 97 % | DIASTOLIC BLOOD PRESSURE: 68 MMHG | SYSTOLIC BLOOD PRESSURE: 118 MMHG | TEMPERATURE: 98.2 F | BODY MASS INDEX: 38.28 KG/M2 | HEIGHT: 70 IN | WEIGHT: 267.4 LBS | HEART RATE: 60 BPM

## 2019-05-09 DIAGNOSIS — R73.01 IMPAIRED FASTING GLUCOSE: Primary | ICD-10-CM

## 2019-05-09 DIAGNOSIS — R79.9 ABNORMAL FINDING OF BLOOD CHEMISTRY: ICD-10-CM

## 2019-05-09 DIAGNOSIS — M15.9 PRIMARY OSTEOARTHRITIS INVOLVING MULTIPLE JOINTS: ICD-10-CM

## 2019-05-09 DIAGNOSIS — C61 PROSTATE CANCER (HCC): ICD-10-CM

## 2019-05-09 DIAGNOSIS — E55.9 VITAMIN D DEFICIENCY: ICD-10-CM

## 2019-05-09 PROBLEM — M41.20 IDIOPATHIC SCOLIOSIS: Status: ACTIVE | Noted: 2019-05-09

## 2019-05-09 LAB
25(OH)D3 SERPL-MCNC: 36.4 NG/ML (ref 30–100)
ALBUMIN SERPL-MCNC: 4.1 G/DL (ref 3.5–5.2)
ALBUMIN/GLOB SERPL: 1.5 G/DL
ALP SERPL-CCNC: 91 U/L (ref 39–117)
ALT SERPL W P-5'-P-CCNC: 17 U/L (ref 1–41)
ANION GAP SERPL CALCULATED.3IONS-SCNC: 13 MMOL/L
AST SERPL-CCNC: 25 U/L (ref 1–40)
BILIRUB SERPL-MCNC: 0.5 MG/DL (ref 0.2–1.2)
BUN BLD-MCNC: 18 MG/DL (ref 8–23)
BUN/CREAT SERPL: 20.5 (ref 7–25)
CALCIUM SPEC-SCNC: 9 MG/DL (ref 8.6–10.5)
CHLORIDE SERPL-SCNC: 107 MMOL/L (ref 98–107)
CHOLEST SERPL-MCNC: 176 MG/DL (ref 0–200)
CO2 SERPL-SCNC: 21 MMOL/L (ref 22–29)
CREAT BLD-MCNC: 0.88 MG/DL (ref 0.76–1.27)
ERYTHROCYTE [DISTWIDTH] IN BLOOD BY AUTOMATED COUNT: 13.2 % (ref 12.3–15.4)
GFR SERPL CREATININE-BSD FRML MDRD: 85 ML/MIN/1.73
GLOBULIN UR ELPH-MCNC: 2.7 GM/DL
GLUCOSE BLD-MCNC: 93 MG/DL (ref 65–99)
HBA1C MFR BLD: 5.52 % (ref 4.8–5.6)
HCT VFR BLD AUTO: 48.2 % (ref 37.5–51)
HDLC SERPL-MCNC: 57 MG/DL (ref 40–60)
HGB BLD-MCNC: 15.8 G/DL (ref 13–17.7)
LDLC SERPL CALC-MCNC: 94 MG/DL (ref 0–100)
LDLC/HDLC SERPL: 1.66 {RATIO}
LYMPHOCYTES # BLD AUTO: 1 10*3/MM3 (ref 0.7–3.1)
LYMPHOCYTES NFR BLD AUTO: 26.6 % (ref 19.6–45.3)
MCH RBC QN AUTO: 29.5 PG (ref 26.6–33)
MCHC RBC AUTO-ENTMCNC: 32.9 G/DL (ref 31.5–35.7)
MCV RBC AUTO: 89.7 FL (ref 79–97)
MONOCYTES # BLD AUTO: 0.3 10*3/MM3 (ref 0.1–0.9)
MONOCYTES NFR BLD AUTO: 6.9 % (ref 5–12)
NEUTROPHILS # BLD AUTO: 2.5 10*3/MM3 (ref 1.7–7)
NEUTROPHILS NFR BLD AUTO: 66.5 % (ref 42.7–76)
PLATELET # BLD AUTO: 134 10*3/MM3 (ref 140–450)
PMV BLD AUTO: 10 FL (ref 6–12)
POTASSIUM BLD-SCNC: 4.1 MMOL/L (ref 3.5–5.2)
PROT SERPL-MCNC: 6.8 G/DL (ref 6–8.5)
RBC # BLD AUTO: 5.37 10*6/MM3 (ref 4.14–5.8)
SODIUM BLD-SCNC: 141 MMOL/L (ref 136–145)
TRIGL SERPL-MCNC: 123 MG/DL (ref 0–150)
VLDLC SERPL-MCNC: 24.6 MG/DL (ref 5–40)
WBC NRBC COR # BLD: 3.8 10*3/MM3 (ref 3.4–10.8)

## 2019-05-09 PROCEDURE — 83036 HEMOGLOBIN GLYCOSYLATED A1C: CPT | Performed by: INTERNAL MEDICINE

## 2019-05-09 PROCEDURE — 80061 LIPID PANEL: CPT | Performed by: INTERNAL MEDICINE

## 2019-05-09 PROCEDURE — 36415 COLL VENOUS BLD VENIPUNCTURE: CPT | Performed by: INTERNAL MEDICINE

## 2019-05-09 PROCEDURE — 99214 OFFICE O/P EST MOD 30 MIN: CPT | Performed by: INTERNAL MEDICINE

## 2019-05-09 PROCEDURE — 80053 COMPREHEN METABOLIC PANEL: CPT | Performed by: INTERNAL MEDICINE

## 2019-05-09 PROCEDURE — 82306 VITAMIN D 25 HYDROXY: CPT | Performed by: INTERNAL MEDICINE

## 2019-05-09 PROCEDURE — 85025 COMPLETE CBC W/AUTO DIFF WBC: CPT | Performed by: INTERNAL MEDICINE

## 2019-05-09 NOTE — PROGRESS NOTES
Subjective   Ramiro Osborne is a 74 y.o. male.     History of Present Illness   Patient was seen for impaired fasting glucose.  Patient's blood sugar was however in the 140s.  He did have labs drawn and we will follow-up in 4 days.  His osteoarthritis been controlled with his exercise and over-the-counter medication.  Patient does have prostate cancer is received radiation with hormone therapy.  Patient is being followed by urologist.  Patient is to have labs and follow-up in 1 week.    Dictated utilizing Dragon dictation. If there are questions or for further clarification, please contact me.  The following portions of the patient's history were reviewed and updated as appropriate: allergies, current medications, past family history, past medical history, past social history, past surgical history and problem list.    Review of Systems   Constitutional: Negative for fatigue and fever.   HENT: Positive for congestion. Negative for trouble swallowing.    Eyes: Negative for discharge and visual disturbance.   Respiratory: Negative for choking and shortness of breath.    Cardiovascular: Negative for chest pain and palpitations.   Gastrointestinal: Negative for abdominal pain and blood in stool.   Endocrine: Negative.    Genitourinary: Negative for genital sores and hematuria.   Musculoskeletal: Negative for gait problem and joint swelling.   Skin: Negative for color change, pallor, rash and wound.   Allergic/Immunologic: Positive for environmental allergies. Negative for immunocompromised state.   Neurological: Negative for facial asymmetry and speech difficulty.   Psychiatric/Behavioral: Negative for hallucinations and suicidal ideas.       Objective   Physical Exam   Constitutional: He is oriented to person, place, and time. He appears well-developed and well-nourished.   HENT:   Head: Normocephalic.   Eyes: Conjunctivae are normal. Pupils are equal, round, and reactive to light.   Neck: Normal range of motion. Neck  supple.   Cardiovascular: Normal rate, regular rhythm and normal heart sounds.   Pulmonary/Chest: Effort normal and breath sounds normal.   Abdominal: Soft. Bowel sounds are normal.   Musculoskeletal: Normal range of motion.   Neurological: He is alert and oriented to person, place, and time.   Skin: Skin is warm and dry.   Psychiatric: He has a normal mood and affect. His behavior is normal. Judgment and thought content normal.   Nursing note and vitals reviewed.      Assessment/Plan   Problems Addressed this Visit        Endocrine    Impaired fasting glucose - Primary    Relevant Orders    CBC & Differential (Completed)    Comprehensive Metabolic Panel    Lipid Panel    Vitamin D 25 Hydroxy    Hemoglobin A1c    CBC Auto Differential (Completed)       Genitourinary    Prostate cancer (CMS/HCC) (Chronic)    Relevant Orders    CBC & Differential (Completed)    Comprehensive Metabolic Panel    Lipid Panel    Vitamin D 25 Hydroxy    Hemoglobin A1c    CBC Auto Differential (Completed)      Other Visit Diagnoses     Primary osteoarthritis involving multiple joints        Relevant Orders    CBC & Differential (Completed)    Comprehensive Metabolic Panel    Lipid Panel    Vitamin D 25 Hydroxy    Hemoglobin A1c    CBC Auto Differential (Completed)    Abnormal finding of blood chemistry         Relevant Orders    Lipid Panel    Vitamin D deficiency         Relevant Orders    Vitamin D 25 Hydroxy

## 2019-05-17 ENCOUNTER — OFFICE VISIT (OUTPATIENT)
Dept: FAMILY MEDICINE CLINIC | Facility: CLINIC | Age: 74
End: 2019-05-17

## 2019-05-17 VITALS
SYSTOLIC BLOOD PRESSURE: 112 MMHG | OXYGEN SATURATION: 93 % | WEIGHT: 267 LBS | BODY MASS INDEX: 38.22 KG/M2 | HEART RATE: 63 BPM | DIASTOLIC BLOOD PRESSURE: 66 MMHG | TEMPERATURE: 98.6 F | HEIGHT: 70 IN

## 2019-05-17 DIAGNOSIS — R73.01 IMPAIRED FASTING GLUCOSE: Primary | ICD-10-CM

## 2019-05-17 PROCEDURE — 99213 OFFICE O/P EST LOW 20 MIN: CPT | Performed by: INTERNAL MEDICINE

## 2019-05-17 NOTE — PROGRESS NOTES
Subjective   Ramiro Osborne is a 74 y.o. male.     History of Present Illness   Patient was seen for elevated blood sugars.  His hemoglobin A1c was 5.5%.  Triglycerides 123, HDL 57, LDL 94.  Patient will follow-up in 6 months with additional labs.    Dictated utilizing Dragon dictation. If there are questions or for further clarification, please contact me.  The following portions of the patient's history were reviewed and updated as appropriate: allergies, current medications, past family history, past medical history, past social history, past surgical history and problem list.    Review of Systems   Constitutional: Negative for fatigue and fever.   HENT: Positive for congestion. Negative for trouble swallowing.    Eyes: Negative for discharge and visual disturbance.   Respiratory: Negative for choking and shortness of breath.    Cardiovascular: Negative for chest pain and palpitations.   Gastrointestinal: Negative for abdominal pain and blood in stool.   Endocrine: Negative.    Genitourinary: Negative for genital sores and hematuria.   Musculoskeletal: Negative for gait problem and joint swelling.   Skin: Negative for color change, pallor, rash and wound.   Allergic/Immunologic: Positive for environmental allergies. Negative for immunocompromised state.   Neurological: Negative for facial asymmetry and speech difficulty.   Psychiatric/Behavioral: Negative for hallucinations and suicidal ideas.       Objective   Physical Exam   Constitutional: He is oriented to person, place, and time. He appears well-developed and well-nourished.   HENT:   Head: Normocephalic.   Eyes: Conjunctivae are normal. Pupils are equal, round, and reactive to light.   Neck: Normal range of motion. Neck supple.   Cardiovascular: Normal rate, regular rhythm and normal heart sounds.   Pulmonary/Chest: Effort normal and breath sounds normal.   Abdominal: Soft. Bowel sounds are normal.   Musculoskeletal: Normal range of motion.   Neurological:  He is alert and oriented to person, place, and time.   Skin: Skin is warm and dry.   Psychiatric: He has a normal mood and affect. His behavior is normal. Judgment and thought content normal.   Nursing note and vitals reviewed.      Assessment/Plan   Problems Addressed this Visit        Endocrine    Impaired fasting glucose - Primary

## 2019-11-20 ENCOUNTER — OFFICE VISIT (OUTPATIENT)
Dept: FAMILY MEDICINE CLINIC | Facility: CLINIC | Age: 74
End: 2019-11-20

## 2019-11-20 VITALS
WEIGHT: 278 LBS | TEMPERATURE: 97.5 F | HEART RATE: 50 BPM | HEIGHT: 70 IN | DIASTOLIC BLOOD PRESSURE: 62 MMHG | BODY MASS INDEX: 39.8 KG/M2 | OXYGEN SATURATION: 95 % | SYSTOLIC BLOOD PRESSURE: 102 MMHG

## 2019-11-20 DIAGNOSIS — E55.9 VITAMIN D DEFICIENCY, UNSPECIFIED: ICD-10-CM

## 2019-11-20 DIAGNOSIS — M41.25 OTHER IDIOPATHIC SCOLIOSIS, THORACOLUMBAR REGION: ICD-10-CM

## 2019-11-20 DIAGNOSIS — C61 PROSTATE CANCER (HCC): Chronic | ICD-10-CM

## 2019-11-20 DIAGNOSIS — Z00.00 MEDICARE ANNUAL WELLNESS VISIT, SUBSEQUENT: Primary | ICD-10-CM

## 2019-11-20 DIAGNOSIS — R73.01 IMPAIRED FASTING GLUCOSE: ICD-10-CM

## 2019-11-20 DIAGNOSIS — R93.3 ABNORMAL FINDINGS ON DIAGNOSTIC IMAGING OF OTHER PARTS OF DIGESTIVE TRACT: ICD-10-CM

## 2019-11-20 LAB
25(OH)D3 SERPL-MCNC: 39 NG/ML (ref 30–100)
ALBUMIN SERPL-MCNC: 4.2 G/DL (ref 3.5–5.2)
ALBUMIN/GLOB SERPL: 1.4 G/DL
ALP SERPL-CCNC: 93 U/L (ref 39–117)
ALT SERPL W P-5'-P-CCNC: 17 U/L (ref 1–41)
ANION GAP SERPL CALCULATED.3IONS-SCNC: 10.3 MMOL/L (ref 5–15)
AST SERPL-CCNC: 24 U/L (ref 1–40)
BILIRUB SERPL-MCNC: 0.4 MG/DL (ref 0.2–1.2)
BUN BLD-MCNC: 17 MG/DL (ref 8–23)
BUN/CREAT SERPL: 20.5 (ref 7–25)
CALCIUM SPEC-SCNC: 8.8 MG/DL (ref 8.6–10.5)
CHLORIDE SERPL-SCNC: 107 MMOL/L (ref 98–107)
CHOLEST SERPL-MCNC: 173 MG/DL (ref 0–200)
CO2 SERPL-SCNC: 23.7 MMOL/L (ref 22–29)
CREAT BLD-MCNC: 0.83 MG/DL (ref 0.76–1.27)
DEPRECATED RDW RBC AUTO: 42.2 FL (ref 37–54)
ERYTHROCYTE [DISTWIDTH] IN BLOOD BY AUTOMATED COUNT: 12.9 % (ref 12.3–15.4)
GFR SERPL CREATININE-BSD FRML MDRD: 91 ML/MIN/1.73
GLOBULIN UR ELPH-MCNC: 3 GM/DL
GLUCOSE BLD-MCNC: 103 MG/DL (ref 65–99)
HBA1C MFR BLD: 6.05 % (ref 4.8–5.6)
HCT VFR BLD AUTO: 46 % (ref 37.5–51)
HDLC SERPL-MCNC: 51 MG/DL (ref 40–60)
HGB BLD-MCNC: 15.6 G/DL (ref 13–17.7)
LDLC SERPL CALC-MCNC: 94 MG/DL (ref 0–100)
LDLC/HDLC SERPL: 1.84 {RATIO}
MCH RBC QN AUTO: 30.8 PG (ref 26.6–33)
MCHC RBC AUTO-ENTMCNC: 33.9 G/DL (ref 31.5–35.7)
MCV RBC AUTO: 90.9 FL (ref 79–97)
PLATELET # BLD AUTO: 137 10*3/MM3 (ref 140–450)
PMV BLD AUTO: 13.3 FL (ref 6–12)
POTASSIUM BLD-SCNC: 3.9 MMOL/L (ref 3.5–5.2)
PROT SERPL-MCNC: 7.2 G/DL (ref 6–8.5)
RBC # BLD AUTO: 5.06 10*6/MM3 (ref 4.14–5.8)
SODIUM BLD-SCNC: 141 MMOL/L (ref 136–145)
TRIGL SERPL-MCNC: 141 MG/DL (ref 0–150)
VLDLC SERPL-MCNC: 28.2 MG/DL (ref 5–40)
WBC NRBC COR # BLD: 3.45 10*3/MM3 (ref 3.4–10.8)

## 2019-11-20 PROCEDURE — 85027 COMPLETE CBC AUTOMATED: CPT | Performed by: INTERNAL MEDICINE

## 2019-11-20 PROCEDURE — 82306 VITAMIN D 25 HYDROXY: CPT | Performed by: INTERNAL MEDICINE

## 2019-11-20 PROCEDURE — G0439 PPPS, SUBSEQ VISIT: HCPCS | Performed by: INTERNAL MEDICINE

## 2019-11-20 PROCEDURE — 83036 HEMOGLOBIN GLYCOSYLATED A1C: CPT | Performed by: INTERNAL MEDICINE

## 2019-11-20 PROCEDURE — 80053 COMPREHEN METABOLIC PANEL: CPT | Performed by: INTERNAL MEDICINE

## 2019-11-20 PROCEDURE — 36415 COLL VENOUS BLD VENIPUNCTURE: CPT | Performed by: INTERNAL MEDICINE

## 2019-11-20 PROCEDURE — 80061 LIPID PANEL: CPT | Performed by: INTERNAL MEDICINE

## 2019-11-20 PROCEDURE — 99214 OFFICE O/P EST MOD 30 MIN: CPT | Performed by: INTERNAL MEDICINE

## 2019-11-20 RX ORDER — MELATONIN
1000 DAILY
COMMUNITY

## 2019-11-20 NOTE — PROGRESS NOTES
Subjective   Ramiro Osborne is a 74 y.o. male.   Patient was seen for Medicare wellness exam.  Patient was seen for prostate cancer.  Patient does have scan pending for extent of his cancer.  Patient states he has an aggressive cancer and is treating it himself.  Patient is a hepatic scoliosis has been stable past several months and is not getting a lot of trouble.  Patient's blood sugars been running in the 130-1 140s.  Patient has low vitamin D is being supplemented with over-the-counter medication.    Dictated utilizing Dragon dictation. If there are questions or for further clarification, please contact me.  History of Present Illness     The following portions of the patient's history were reviewed and updated as appropriate: allergies, current medications, past family history, past medical history, past social history, past surgical history and problem list.    Review of Systems   Constitutional: Negative for fatigue and fever.   HENT: Positive for congestion. Negative for trouble swallowing.    Eyes: Negative for discharge and visual disturbance.   Respiratory: Negative for choking and shortness of breath.    Cardiovascular: Negative for chest pain and palpitations.   Gastrointestinal: Negative for abdominal pain and blood in stool.   Endocrine: Negative.    Genitourinary: Negative for genital sores and hematuria.   Musculoskeletal: Negative for gait problem and joint swelling.   Skin: Negative for color change, pallor, rash and wound.   Allergic/Immunologic: Positive for environmental allergies. Negative for immunocompromised state.   Neurological: Negative for facial asymmetry and speech difficulty.   Psychiatric/Behavioral: Negative for hallucinations and suicidal ideas.       Objective   Physical Exam   Constitutional: He is oriented to person, place, and time. He appears well-developed and well-nourished.   HENT:   Head: Normocephalic.   Eyes: Conjunctivae are normal. Pupils are equal, round, and  reactive to light.   Neck: Normal range of motion. Neck supple.   Cardiovascular: Normal rate, regular rhythm and normal heart sounds.   Pulmonary/Chest: Effort normal and breath sounds normal.   Abdominal: Soft. Bowel sounds are normal.   Musculoskeletal: Normal range of motion.   Neurological: He is alert and oriented to person, place, and time.   Skin: Skin is warm and dry.   Psychiatric: He has a normal mood and affect. His behavior is normal. Judgment and thought content normal.   Nursing note and vitals reviewed.      Assessment/Plan #1 recheck blood sugar today #2 follow-up from last week on his prostate cancer.  #3 idiopathic scoliosis doing well #4 hemoglobin A1c was 7.6% 1 month ago.  Patient was advised to eat a low triglyceride sugar diet.  #5 patient advised low exercise activity 3-4 times a week.  Ramiro was seen today for annual exam and labs only.    Diagnoses and all orders for this visit:    Medicare annual wellness visit, subsequent    Impaired fasting glucose  -     CBC (No Diff); Future  -     Comprehensive Metabolic Panel; Future  -     Lipid Panel; Future  -     Vitamin D 25 Hydroxy; Future  -     Hemoglobin A1c; Future  -     CBC (No Diff)  -     Comprehensive Metabolic Panel  -     Lipid Panel  -     Vitamin D 25 Hydroxy  -     Hemoglobin A1c    Other idiopathic scoliosis, thoracolumbar region  -     CBC (No Diff); Future  -     Comprehensive Metabolic Panel; Future  -     Lipid Panel; Future  -     Vitamin D 25 Hydroxy; Future  -     Hemoglobin A1c; Future  -     CBC (No Diff)  -     Comprehensive Metabolic Panel  -     Lipid Panel  -     Vitamin D 25 Hydroxy  -     Hemoglobin A1c    Prostate cancer (CMS/HCC)  -     CBC (No Diff); Future  -     Comprehensive Metabolic Panel; Future  -     Lipid Panel; Future  -     Vitamin D 25 Hydroxy; Future  -     Hemoglobin A1c; Future  -     CBC (No Diff)  -     Comprehensive Metabolic Panel  -     Lipid Panel  -     Vitamin D 25 Hydroxy  -      Hemoglobin A1c    Abnormal findings on diagnostic imaging of other parts of digestive tract   -     Lipid Panel; Future  -     Lipid Panel    Vitamin D deficiency, unspecified   -     Vitamin D 25 Hydroxy; Future  -     Vitamin D 25 Hydroxy

## 2019-11-20 NOTE — PROGRESS NOTES
Subsequent Medicare Wellness Visit   The ABC's of the Annual Wellness Visit    Chief Complaint   Patient presents with   • Annual Exam     6 month checkup   • Labs Only     pt fasting       HPI:  Ramiro Osborne, -1945, is a 74 y.o. male who presents for a Subsequent Medicare Wellness Visit.    Recent Hospitalizations:  Recently treated at the following:  [facility].    Current Medical Providers:  Patient Care Team:  Kris Man MD as PCP - General (Internal Medicine)  Kris Man MD as PCP - Claims Attributed  Kris Domingo MD as Consulting Physician (Urology)  Ravi Turner MD as Consulting Physician (Dermatology)  Jessika Escobar MD as Consulting Physician (Ophthalmology)    Health Habits and Functional and Cognitive Screening and Depression Screening:  Functional & Cognitive Status 2019   Do you have difficulty preparing food and eating? No   Do you have difficulty bathing yourself, getting dressed or grooming yourself? No   Do you have difficulty using the toilet? No   Do you have difficulty moving around from place to place? No   Do you have trouble with steps or getting out of a bed or a chair? No   Current Diet Well Balanced Diet   Dental Exam Up to date   Eye Exam Up to date   Exercise (times per week) 3 times per week   Current Exercise Activities Include Aerobics   Do you need help using the phone?  No   Are you deaf or do you have serious difficulty hearing?  No   Do you need help with transportation? No   Do you need help shopping? No   Do you need help preparing meals?  No   Do you need help with housework?  No   Do you need help with laundry? No   Do you need help taking your medications? No   Do you need help managing money? No   Do you ever drive or ride in a car without wearing a seat belt? No   Have you felt unusual stress, anger or loneliness in the last month? No   Who do you live with? Spouse   If you need help, do you have trouble finding  someone available to you? No   Have you been bothered in the last four weeks by sexual problems? No   Do you have difficulty concentrating, remembering or making decisions? No       Compared to one year ago, the patient feels his physical health is the same and his mental health is the same.    Depression Screen:  PHQ-2/PHQ-9 Depression Screening 11/20/2019   Little interest or pleasure in doing things 0   Feeling down, depressed, or hopeless 0   Trouble falling or staying asleep, or sleeping too much 0   Feeling tired or having little energy 0   Poor appetite or overeating 0   Feeling bad about yourself - or that you are a failure or have let yourself or your family down 0   Trouble concentrating on things, such as reading the newspaper or watching television 0   Moving or speaking so slowly that other people could have noticed. Or the opposite - being so fidgety or restless that you have been moving around a lot more than usual 0   Thoughts that you would be better off dead, or of hurting yourself in some way 0   Total Score 0   If you checked off any problems, how difficult have these problems made it for you to do your work, take care of things at home, or get along with other people? Not difficult at all         Past Medical/Family/Social History:  The following portions of the patient's history were reviewed and updated as appropriate: allergies, current medications, past family history, past medical history, past social history, past surgical history and problem list.    No Known Allergies      Current Outpatient Medications:   •  CALCIUM CITRATE PO, Take 1,000 mg by mouth., Disp: , Rfl:   •  cholecalciferol (VITAMIN D3) 25 MCG (1000 UT) tablet, Take 1,000 Units by mouth 2 (Two) Times a Day., Disp: , Rfl:   •  ciclopirox (LOPROX) 1 % shampoo, , Disp: , Rfl:   •  fluocinolone (SYNALAR) 0.01 % external solution, as needed., Disp: , Rfl:   •  ketoconazole (NIZORAL) 2 % cream, Apply  topically Daily., Disp: ,  "Rfl:   •  Multiple Vitamins-Minerals (MULTI FOR HIM 50+ PO), Take  by mouth., Disp: , Rfl:   •  ULTIMATE BOBBI BABY PROBIOTIC POWDER, Take  by mouth., Disp: , Rfl:   •  Calcium Carbonate-Vit D-Min (CALCIUM 1200 PO), Take 3,000 Units by mouth., Disp: , Rfl:     Aspirin use counseling: Does not need ASA (and currently is not on it)    Current medication list contains no high risk medications.  No harmful drug interactions have been identified.     Family History   Problem Relation Age of Onset   • Emphysema Mother    • COPD Mother    • Lung cancer Father    • Cancer Sister         Uterine       Social History     Tobacco Use   • Smoking status: Former Smoker     Types: Cigarettes   • Smokeless tobacco: Never Used   • Tobacco comment: Quit in the 1980s   Substance Use Topics   • Alcohol use: Yes     Comment: rare       Past Surgical History:   Procedure Laterality Date   • BRONCHOSCOPY Bilateral 1/22/2019    Procedure: BRONCHOSCOPY WITH BAL OF RIGHT LOWER LOBE AND RIGHT MIDDLE LOBE COMBINED;  Surgeon: Donnie Mcwilliams MD;  Location: [facility] ENDOSCOPY;  Service: Pulmonary   • COLONOSCOPY  02/2011    Within Normal Limits. Dr. Lema. 03/2007- 9mm polyps   • EYE ENUCLEATION     • PILONIDAL CYSTECTOMY     • WRIST SURGERY         Patient Active Problem List   Diagnosis   • Prostate cancer (CMS/HCC)   • H/O colonoscopy with polypectomy   • Aspiration pneumonia of right middle lobe (CMS/HCC)   • Aspiration of foreign body   • Skin infection   • Tinnitus   • Blood in stool   • Impaired fasting glucose   • Idiopathic scoliosis       Review of Systems    Objective     Vitals:    11/20/19 0859   BP: 102/62   BP Location: Left arm   Patient Position: Sitting   Cuff Size: Large Adult   Pulse: 50   Temp: 97.5 °F (36.4 °C)   TempSrc: Oral   SpO2: 95%   Weight: 126 kg (278 lb)   Height: 177.8 cm (70\")   PainSc: 0-No pain       Patient's Body mass index is 39.89 kg/m². BMI is within normal parameters. No follow-up required..      No " exam data present    The patient has no evidence of cognitve impairment.     Physical Exam    Recent Lab Results:  Lab Results   Component Value Date     (H) 10/17/2018     Lab Results   Component Value Date    CHOL 176 05/09/2019    TRIG 123 05/09/2019    HDL 57 05/09/2019    VLDL 24.6 05/09/2019    LDLHDL 1.66 05/09/2019       Assessment/Plan   Age-appropriate Screening Schedule:  Refer to the list below for future screening recommendations based on patient's age, sex and/or medical conditions.      Health Maintenance   Topic Date Due   • ZOSTER VACCINE (2 of 2) 12/11/2017   • LIPID PANEL  05/09/2020   • TDAP/TD VACCINES (2 - Td) 11/19/2023   • COLONOSCOPY  04/18/2026   • INFLUENZA VACCINE  Completed   • PNEUMOCOCCAL VACCINES (65+ LOW/MEDIUM RISK)  Completed       Medicare Risks and Personalized Health Plan:  NA      CMS-Preventive Services Quick Reference  Medicare Preventive Services Addressed:  NA    Advance Care Planning:  Patient has an advance directive - a copy has not been provided. Have asked the patient to send this to us to add to record    There are no diagnoses linked to this encounter.    An After Visit Summary and PPPS with all of these plans were given to the patient.      Follow Up:  No Follow-up on file.

## 2019-11-25 ENCOUNTER — TELEPHONE (OUTPATIENT)
Dept: FAMILY MEDICINE CLINIC | Facility: CLINIC | Age: 74
End: 2019-11-25

## 2020-05-22 ENCOUNTER — OFFICE VISIT (OUTPATIENT)
Dept: FAMILY MEDICINE CLINIC | Facility: CLINIC | Age: 75
End: 2020-05-22

## 2020-05-22 VITALS
WEIGHT: 279 LBS | RESPIRATION RATE: 18 BRPM | OXYGEN SATURATION: 96 % | DIASTOLIC BLOOD PRESSURE: 62 MMHG | HEART RATE: 58 BPM | HEIGHT: 70 IN | TEMPERATURE: 97.8 F | SYSTOLIC BLOOD PRESSURE: 116 MMHG | BODY MASS INDEX: 39.94 KG/M2

## 2020-05-22 DIAGNOSIS — M15.9 PRIMARY OSTEOARTHRITIS INVOLVING MULTIPLE JOINTS: ICD-10-CM

## 2020-05-22 DIAGNOSIS — C61 PROSTATE CANCER (HCC): Chronic | ICD-10-CM

## 2020-05-22 DIAGNOSIS — R73.01 IMPAIRED FASTING GLUCOSE: Primary | ICD-10-CM

## 2020-05-22 DIAGNOSIS — E78.2 MIXED HYPERLIPIDEMIA: ICD-10-CM

## 2020-05-22 DIAGNOSIS — E55.9 VITAMIN D DEFICIENCY, UNSPECIFIED: ICD-10-CM

## 2020-05-22 PROCEDURE — 99214 OFFICE O/P EST MOD 30 MIN: CPT | Performed by: INTERNAL MEDICINE

## 2020-05-22 NOTE — PROGRESS NOTES
Subjective   Ramiro Osborne is a 75 y.o. male.     History of Present Illness   Patient was seen for impaired fasting glucose.  Patient blood sugars fluctuate in the 120s to 130s.  His hemoglobin A1c was 6.0% on 11/20/2019.  Patient did have labs today and will follow-up in 4 days.  Patient does have osteoarthritis and uses over-the-counter pain medication when needed.  Patient is very active and ambulates daily.  Patient does have vitamin D3 deficiency supplemented with over-the-counter D3.  Patient's lipids are also controlled with diet and exercise.  Triglycerides 141, HDL 51, LDL 94.  Patient has been following up with urology and had a reoccurrence of his prostate cancer.  Patient is beginning hormonal treatments and also is starting Prolia to prevent osteoporosis.  Patient will have labs and follow-up in 4 days.  Patient return to clinic in 6 months.  The following portions of the patient's history were reviewed and updated as appropriate: allergies, current medications, past family history, past medical history, past social history, past surgical history and problem list.    Review of Systems   Constitutional: Negative for fatigue and fever.   HENT: Positive for congestion. Negative for trouble swallowing.    Eyes: Negative for discharge and visual disturbance.   Respiratory: Negative for choking and shortness of breath.    Cardiovascular: Negative for chest pain and palpitations.   Gastrointestinal: Negative for abdominal pain and blood in stool.   Endocrine: Negative.    Genitourinary: Negative for genital sores and hematuria.   Musculoskeletal: Positive for arthralgias and joint swelling. Negative for gait problem.   Skin: Negative for color change, pallor, rash and wound.   Allergic/Immunologic: Positive for environmental allergies. Negative for immunocompromised state.   Neurological: Negative for facial asymmetry and speech difficulty.   Psychiatric/Behavioral: Negative for hallucinations and suicidal  ideas.       Objective   Physical Exam   Constitutional: He is oriented to person, place, and time. He appears well-developed and well-nourished.   HENT:   Head: Normocephalic.   Eyes: Pupils are equal, round, and reactive to light. Conjunctivae are normal.   Neck: Normal range of motion. Neck supple.   Cardiovascular: Normal rate, regular rhythm and normal heart sounds. Exam reveals no gallop and no friction rub.   No murmur heard.  Pulmonary/Chest: Effort normal and breath sounds normal. No stridor. He has no wheezes. He has no rales. He exhibits no tenderness.   Abdominal: Soft. Bowel sounds are normal.   Musculoskeletal: Normal range of motion.   Neurological: He is alert and oriented to person, place, and time.   Skin: Skin is warm and dry.   Psychiatric: He has a normal mood and affect. His behavior is normal. Judgment and thought content normal.   Nursing note and vitals reviewed.      Assessment/Plan #1 continue to monitor blood sugars #2 labs #3 continue his present level low activity and diet.  Ramiro was seen today for yearly check up and prostate cancer.    Diagnoses and all orders for this visit:    Impaired fasting glucose  -     CBC (No Diff); Future  -     Comprehensive Metabolic Panel  -     Lipid Panel  -     Hemoglobin A1c  -     Vitamin D 25 Hydroxy    Vitamin D deficiency, unspecified   -     CBC (No Diff); Future  -     Comprehensive Metabolic Panel  -     Lipid Panel  -     Hemoglobin A1c  -     Vitamin D 25 Hydroxy    Primary osteoarthritis involving multiple joints  -     CBC (No Diff); Future  -     Comprehensive Metabolic Panel  -     Lipid Panel  -     Hemoglobin A1c  -     Vitamin D 25 Hydroxy    Mixed hyperlipidemia  -     CBC (No Diff); Future  -     Comprehensive Metabolic Panel  -     Lipid Panel  -     Hemoglobin A1c  -     Vitamin D 25 Hydroxy    Prostate cancer (CMS/HCC)    Other orders  -     denosumab (PROLIA) 60 MG/ML solution prefilled syringe syringe; Inject 1 mL under the  skin into the appropriate area as directed 1 (One) Time for 1 dose.

## 2020-05-27 ENCOUNTER — LAB (OUTPATIENT)
Dept: FAMILY MEDICINE CLINIC | Facility: CLINIC | Age: 75
End: 2020-05-27

## 2020-05-27 DIAGNOSIS — E78.2 MIXED HYPERLIPIDEMIA: ICD-10-CM

## 2020-05-27 DIAGNOSIS — R73.01 IMPAIRED FASTING GLUCOSE: ICD-10-CM

## 2020-05-27 DIAGNOSIS — E55.9 VITAMIN D DEFICIENCY, UNSPECIFIED: ICD-10-CM

## 2020-05-27 DIAGNOSIS — M15.9 PRIMARY OSTEOARTHRITIS INVOLVING MULTIPLE JOINTS: ICD-10-CM

## 2020-05-27 LAB
25(OH)D3 SERPL-MCNC: 34.3 NG/ML (ref 30–100)
ALBUMIN SERPL-MCNC: 4.2 G/DL (ref 3.5–5.2)
ALBUMIN/GLOB SERPL: 1.6 G/DL
ALP SERPL-CCNC: 91 U/L (ref 39–117)
ALT SERPL W P-5'-P-CCNC: 14 U/L (ref 1–41)
ANION GAP SERPL CALCULATED.3IONS-SCNC: 9.6 MMOL/L (ref 5–15)
AST SERPL-CCNC: 26 U/L (ref 1–40)
BILIRUB SERPL-MCNC: 0.6 MG/DL (ref 0.2–1.2)
BUN BLD-MCNC: 21 MG/DL (ref 8–23)
BUN/CREAT SERPL: 25.6 (ref 7–25)
CALCIUM SPEC-SCNC: 9.1 MG/DL (ref 8.6–10.5)
CHLORIDE SERPL-SCNC: 105 MMOL/L (ref 98–107)
CHOLEST SERPL-MCNC: 151 MG/DL (ref 0–200)
CO2 SERPL-SCNC: 23.4 MMOL/L (ref 22–29)
CREAT BLD-MCNC: 0.82 MG/DL (ref 0.76–1.27)
DEPRECATED RDW RBC AUTO: 41.4 FL (ref 37–54)
ERYTHROCYTE [DISTWIDTH] IN BLOOD BY AUTOMATED COUNT: 12.6 % (ref 12.3–15.4)
GFR SERPL CREATININE-BSD FRML MDRD: 92 ML/MIN/1.73
GLOBULIN UR ELPH-MCNC: 2.6 GM/DL
GLUCOSE BLD-MCNC: 105 MG/DL (ref 65–99)
HBA1C MFR BLD: 5.8 % (ref 4.8–5.6)
HCT VFR BLD AUTO: 44.6 % (ref 37.5–51)
HDLC SERPL-MCNC: 46 MG/DL (ref 40–60)
HGB BLD-MCNC: 15.1 G/DL (ref 13–17.7)
LDLC SERPL CALC-MCNC: 82 MG/DL (ref 0–100)
LDLC/HDLC SERPL: 1.79 {RATIO}
MCH RBC QN AUTO: 30.8 PG (ref 26.6–33)
MCHC RBC AUTO-ENTMCNC: 33.9 G/DL (ref 31.5–35.7)
MCV RBC AUTO: 90.8 FL (ref 79–97)
PLATELET # BLD AUTO: 144 10*3/MM3 (ref 140–450)
PMV BLD AUTO: 12.9 FL (ref 6–12)
POTASSIUM BLD-SCNC: 4.1 MMOL/L (ref 3.5–5.2)
PROT SERPL-MCNC: 6.8 G/DL (ref 6–8.5)
RBC # BLD AUTO: 4.91 10*6/MM3 (ref 4.14–5.8)
SODIUM BLD-SCNC: 138 MMOL/L (ref 136–145)
TRIGL SERPL-MCNC: 113 MG/DL (ref 0–150)
VLDLC SERPL-MCNC: 22.6 MG/DL (ref 5–40)
WBC NRBC COR # BLD: 6.23 10*3/MM3 (ref 3.4–10.8)

## 2020-05-27 PROCEDURE — 80053 COMPREHEN METABOLIC PANEL: CPT | Performed by: INTERNAL MEDICINE

## 2020-05-27 PROCEDURE — 80061 LIPID PANEL: CPT | Performed by: INTERNAL MEDICINE

## 2020-05-27 PROCEDURE — 82306 VITAMIN D 25 HYDROXY: CPT | Performed by: INTERNAL MEDICINE

## 2020-05-27 PROCEDURE — 36415 COLL VENOUS BLD VENIPUNCTURE: CPT | Performed by: INTERNAL MEDICINE

## 2020-05-27 PROCEDURE — 85027 COMPLETE CBC AUTOMATED: CPT | Performed by: INTERNAL MEDICINE

## 2020-05-27 PROCEDURE — 83036 HEMOGLOBIN GLYCOSYLATED A1C: CPT | Performed by: INTERNAL MEDICINE

## 2020-11-24 ENCOUNTER — OFFICE VISIT (OUTPATIENT)
Dept: FAMILY MEDICINE CLINIC | Facility: CLINIC | Age: 75
End: 2020-11-24

## 2020-11-24 VITALS
OXYGEN SATURATION: 97 % | TEMPERATURE: 97.1 F | HEIGHT: 70 IN | SYSTOLIC BLOOD PRESSURE: 114 MMHG | BODY MASS INDEX: 40.23 KG/M2 | DIASTOLIC BLOOD PRESSURE: 64 MMHG | WEIGHT: 281 LBS | HEART RATE: 70 BPM

## 2020-11-24 DIAGNOSIS — Z00.00 MEDICARE ANNUAL WELLNESS VISIT, SUBSEQUENT: Primary | ICD-10-CM

## 2020-11-24 DIAGNOSIS — R73.01 IMPAIRED FASTING GLUCOSE: ICD-10-CM

## 2020-11-24 DIAGNOSIS — E78.2 MIXED HYPERLIPIDEMIA: ICD-10-CM

## 2020-11-24 DIAGNOSIS — E55.9 VITAMIN D DEFICIENCY, UNSPECIFIED: ICD-10-CM

## 2020-11-24 LAB
25(OH)D3 SERPL-MCNC: 39.2 NG/ML (ref 30–100)
ALBUMIN SERPL-MCNC: 4.1 G/DL (ref 3.5–5.2)
ALBUMIN/GLOB SERPL: 1.3 G/DL
ALP SERPL-CCNC: 81 U/L (ref 39–117)
ALT SERPL W P-5'-P-CCNC: 16 U/L (ref 1–41)
ANION GAP SERPL CALCULATED.3IONS-SCNC: 8.7 MMOL/L (ref 5–15)
AST SERPL-CCNC: 25 U/L (ref 1–40)
BILIRUB SERPL-MCNC: 0.5 MG/DL (ref 0–1.2)
BUN SERPL-MCNC: 21 MG/DL (ref 8–23)
BUN/CREAT SERPL: 26.6 (ref 7–25)
CALCIUM SPEC-SCNC: 9 MG/DL (ref 8.6–10.5)
CHLORIDE SERPL-SCNC: 107 MMOL/L (ref 98–107)
CHOLEST SERPL-MCNC: 187 MG/DL (ref 0–200)
CO2 SERPL-SCNC: 22.3 MMOL/L (ref 22–29)
CREAT SERPL-MCNC: 0.79 MG/DL (ref 0.76–1.27)
DEPRECATED RDW RBC AUTO: 41.6 FL (ref 37–54)
ERYTHROCYTE [DISTWIDTH] IN BLOOD BY AUTOMATED COUNT: 12.4 % (ref 12.3–15.4)
GFR SERPL CREATININE-BSD FRML MDRD: 96 ML/MIN/1.73
GLOBULIN UR ELPH-MCNC: 3.2 GM/DL
GLUCOSE SERPL-MCNC: 94 MG/DL (ref 65–99)
HBA1C MFR BLD: 5.6 % (ref 4.8–5.6)
HCT VFR BLD AUTO: 48.6 % (ref 37.5–51)
HDLC SERPL-MCNC: 57 MG/DL (ref 40–60)
HGB BLD-MCNC: 16 G/DL (ref 13–17.7)
LDLC SERPL CALC-MCNC: 104 MG/DL (ref 0–100)
LDLC/HDLC SERPL: 1.75 {RATIO}
MCH RBC QN AUTO: 30.2 PG (ref 26.6–33)
MCHC RBC AUTO-ENTMCNC: 32.9 G/DL (ref 31.5–35.7)
MCV RBC AUTO: 91.9 FL (ref 79–97)
PLATELET # BLD AUTO: 154 10*3/MM3 (ref 140–450)
PMV BLD AUTO: 13.3 FL (ref 6–12)
POTASSIUM SERPL-SCNC: 4.3 MMOL/L (ref 3.5–5.2)
PROT SERPL-MCNC: 7.3 G/DL (ref 6–8.5)
RBC # BLD AUTO: 5.29 10*6/MM3 (ref 4.14–5.8)
SODIUM SERPL-SCNC: 138 MMOL/L (ref 136–145)
TRIGL SERPL-MCNC: 150 MG/DL (ref 0–150)
VLDLC SERPL-MCNC: 26 MG/DL (ref 5–40)
WBC # BLD AUTO: 3.98 10*3/MM3 (ref 3.4–10.8)

## 2020-11-24 PROCEDURE — G0439 PPPS, SUBSEQ VISIT: HCPCS | Performed by: INTERNAL MEDICINE

## 2020-11-24 PROCEDURE — 99214 OFFICE O/P EST MOD 30 MIN: CPT | Performed by: INTERNAL MEDICINE

## 2020-11-24 PROCEDURE — 83036 HEMOGLOBIN GLYCOSYLATED A1C: CPT | Performed by: INTERNAL MEDICINE

## 2020-11-24 PROCEDURE — 80053 COMPREHEN METABOLIC PANEL: CPT | Performed by: INTERNAL MEDICINE

## 2020-11-24 PROCEDURE — 36415 COLL VENOUS BLD VENIPUNCTURE: CPT | Performed by: INTERNAL MEDICINE

## 2020-11-24 PROCEDURE — 85027 COMPLETE CBC AUTOMATED: CPT | Performed by: INTERNAL MEDICINE

## 2020-11-24 PROCEDURE — 80061 LIPID PANEL: CPT | Performed by: INTERNAL MEDICINE

## 2020-11-24 PROCEDURE — 82306 VITAMIN D 25 HYDROXY: CPT | Performed by: INTERNAL MEDICINE

## 2020-11-24 NOTE — PATIENT INSTRUCTIONS
Medicare Wellness  Personal Prevention Plan of Service     Date of Office Visit:  2020  Encounter Provider:  Kris Man MD  Place of Service:  [facility]  Patient Name: Ramiro Osborne  :  1945    As part of the Medicare Wellness portion of your visit today, we are providing you with this personalized preventive plan of services (PPPS). This plan is based upon recommendations of the [place] Preventive Services Task Force (USPSTF) and the Advisory Committee on Immunization Practices (ACIP).    This lists the preventive care services that should be considered, and provides dates of when you are due. Items listed as completed are up-to-date and do not require any further intervention.    Health Maintenance   Topic Date Due   • ANNUAL WELLNESS VISIT  2020   • LIPID PANEL  2021   • TDAP/TD VACCINES (2 - Td) 2023   • COLONOSCOPY  2026   • HEPATITIS C SCREENING  Completed   • INFLUENZA VACCINE  Completed   • Pneumococcal Vaccine 65+  Completed   • AAA SCREEN (ONE-TIME)  Completed   • ZOSTER VACCINE  Completed       Orders Placed This Encounter   Procedures   • CBC (No Diff)     Standing Status:   Future     Standing Expiration Date:   2021   • Comprehensive Metabolic Panel   • Lipid Panel   • Vitamin D 25 Hydroxy   • Hemoglobin A1c       No follow-ups on file.

## 2020-11-24 NOTE — PROGRESS NOTES
Subsequent Medicare Wellness Visit   The ABC's of the Annual Wellness Visit    Chief Complaint   Patient presents with   • Follow-up     6MONTHS       HPI:  Ramiro Osborne, -1945, is a 75 y.o. male who presents for a Subsequent Medicare Wellness Visit.  Patient was seen for a Medicare wellness exam.  Patient was seen for lipids.  Patient is using diet exercise for lipid control.  Triglycerides 113, HDL 46, LDL 82.  Patient does have a history of impaired fasting glucose but blood sugars been running 110s.  Patient will continue his present diet and activity levels.  Patient has vitamin D3 deficiency supplement with over-the-counter D3.  Patient is taking at 1000 units twice a day.    Dictated utilizing Dragon dictation. If there are questions or for further clarification, please contact me.  Recent Hospitalizations:  No hospitalization(s) within the last year..    Current Medical Providers:  Patient Care Team:  Kris Man MD as PCP - General (Internal Medicine)  Kris Domingo MD as Consulting Physician (Urology)  Ravi Turner MD as Consulting Physician (Dermatology)  Jessika Escobar MD as Consulting Physician (Ophthalmology)    Health Habits and Functional and Cognitive Screening and Depression Screening:  Functional & Cognitive Status 2020   Do you have difficulty preparing food and eating? No   Do you have difficulty bathing yourself, getting dressed or grooming yourself? No   Do you have difficulty using the toilet? No   Do you have difficulty moving around from place to place? No   Do you have trouble with steps or getting out of a bed or a chair? No   Current Diet Well Balanced Diet   Dental Exam Up to date        Dental Exam Comment -   Eye Exam Up to date        Eye Exam Comment -   Exercise (times per week) 7 times per week   Current Exercises Include Aerobics   Current Exercise Activities Include -   Do you need help using the phone?  No   Are you deaf or do you have  serious difficulty hearing?  No   Do you need help with transportation? No   Do you need help shopping? No   Do you need help preparing meals?  No   Do you need help with housework?  No   Do you need help with laundry? No   Do you need help taking your medications? No   Do you need help managing money? No   Do you ever drive or ride in a car without wearing a seat belt? No   Have you felt unusual stress, anger or loneliness in the last month? No   Who do you live with? Spouse   If you need help, do you have trouble finding someone available to you? No   Have you been bothered in the last four weeks by sexual problems? No   Do you have difficulty concentrating, remembering or making decisions? No       Compared to one year ago, the patient feels his physical health is the same and his mental health is the same.    Depression Screen:  PHQ-2/PHQ-9 Depression Screening 11/24/2020   Little interest or pleasure in doing things 0   Feeling down, depressed, or hopeless 0   Trouble falling or staying asleep, or sleeping too much 0   Feeling tired or having little energy 0   Poor appetite or overeating 0   Feeling bad about yourself - or that you are a failure or have let yourself or your family down 0   Trouble concentrating on things, such as reading the newspaper or watching television 0   Moving or speaking so slowly that other people could have noticed. Or the opposite - being so fidgety or restless that you have been moving around a lot more than usual 0   Thoughts that you would be better off dead, or of hurting yourself in some way 0   Total Score 0   If you checked off any problems, how difficult have these problems made it for you to do your work, take care of things at home, or get along with other people? Not difficult at all         Past Medical/Family/Social History:  The following portions of the patient's history were reviewed and updated as appropriate: allergies, current medications, past family history,  past medical history, past social history, past surgical history and problem list.    No Known Allergies      Current Outpatient Medications:   •  Calcium Carbonate-Vit D-Min (CALCIUM 1200 PO), Take 3,000 Units by mouth., Disp: , Rfl:   •  cholecalciferol (VITAMIN D3) 25 MCG (1000 UT) tablet, Take 1,000 Units by mouth 2 (Two) Times a Day., Disp: , Rfl:   •  ciclopirox (LOPROX) 1 % shampoo, , Disp: , Rfl:   •  Copper Gluconate (COPPER CAPS PO), Take  by mouth., Disp: , Rfl:   •  Denosumab (PROLIA SC), Inject  under the skin into the appropriate area as directed., Disp: , Rfl:   •  fluocinolone (SYNALAR) 0.01 % external solution, as needed., Disp: , Rfl:   •  Magnesium 400 MG capsule, Take  by mouth Daily., Disp: , Rfl:   •  Multiple Vitamins-Minerals (MULTI FOR HIM 50+ PO), Take  by mouth., Disp: , Rfl:   •  ketoconazole (NIZORAL) 2 % cream, Apply  topically Daily., Disp: , Rfl:     Aspirin use counseling: Does not need ASA (and currently is not on it)    Current medication list contains no high risk medications.  No harmful drug interactions have been identified.     Family History   Problem Relation Age of Onset   • Emphysema Mother    • COPD Mother    • Lung cancer Father    • Cancer Sister         Uterine       Social History     Tobacco Use   • Smoking status: Former Smoker     Types: Cigarettes   • Smokeless tobacco: Never Used   • Tobacco comment: Quit in the 1980s   Substance Use Topics   • Alcohol use: Yes     Comment: rare       Past Surgical History:   Procedure Laterality Date   • BRONCHOSCOPY Bilateral 1/22/2019    Procedure: BRONCHOSCOPY WITH BAL OF RIGHT LOWER LOBE AND RIGHT MIDDLE LOBE COMBINED;  Surgeon: Donnie Mcwilliams MD;  Location: [facility] ENDOSCOPY;  Service: Pulmonary   • COLONOSCOPY  02/2011    Within Normal Limits. Dr. Lema. 03/2007- 9mm polyps   • EYE ENUCLEATION     • PILONIDAL CYSTECTOMY     • WRIST SURGERY         Patient Active Problem List   Diagnosis   • Prostate cancer (CMS/HCC)   •  "H/O colonoscopy with polypectomy   • Aspiration pneumonia of right middle lobe (CMS/HCC)   • Aspiration of foreign body   • Skin infection   • Tinnitus   • Blood in stool   • Impaired fasting glucose   • Idiopathic scoliosis       Review of Systems   Constitutional: Negative for fatigue and fever.   HENT: Positive for congestion. Negative for trouble swallowing.    Eyes: Negative for discharge and visual disturbance.   Respiratory: Negative for choking and shortness of breath.    Cardiovascular: Negative for chest pain and palpitations.   Gastrointestinal: Negative for abdominal pain and blood in stool.   Endocrine: Negative.    Genitourinary: Negative for genital sores and hematuria.   Musculoskeletal: Negative for gait problem and joint swelling.   Skin: Negative for color change, pallor, rash and wound.   Allergic/Immunologic: Positive for environmental allergies. Negative for immunocompromised state.   Neurological: Negative for facial asymmetry and speech difficulty.   Psychiatric/Behavioral: Negative for hallucinations and suicidal ideas.       Objective     Vitals:    11/24/20 1306   BP: 114/64   BP Location: Left arm   Patient Position: Sitting   Cuff Size: Large Adult   Pulse: 70   Temp: 97.1 °F (36.2 °C)   TempSrc: Temporal   SpO2: 97%   Weight: 127 kg (281 lb)   Height: 177.8 cm (70\")   PainSc: 0-No pain       Patient's Body mass index is 40.32 kg/m². BMI is within normal parameters. No follow-up required..      No exam data present    The patient has no evidence of cognitve impairment.     Physical Exam  Vitals signs and nursing note reviewed.   Constitutional:       Appearance: Normal appearance. He is well-developed.   HENT:      Head: Normocephalic and atraumatic.      Nose: Nose normal.      Mouth/Throat:      Mouth: Mucous membranes are moist.      Pharynx: Oropharynx is clear.   Eyes:      Extraocular Movements: Extraocular movements intact.      Conjunctiva/sclera: Conjunctivae normal.      " Pupils: Pupils are equal, round, and reactive to light.   Neck:      Musculoskeletal: Normal range of motion and neck supple.   Cardiovascular:      Rate and Rhythm: Normal rate and regular rhythm.      Heart sounds: Normal heart sounds. No murmur. No friction rub. No gallop.    Pulmonary:      Effort: Pulmonary effort is normal. No respiratory distress.      Breath sounds: Normal breath sounds. No stridor. No wheezing, rhonchi or rales.   Chest:      Chest wall: No tenderness.   Abdominal:      General: Bowel sounds are normal.      Palpations: Abdomen is soft.   Musculoskeletal: Normal range of motion.   Skin:     General: Skin is warm and dry.   Neurological:      General: No focal deficit present.      Mental Status: He is alert and oriented to person, place, and time. Mental status is at baseline.   Psychiatric:         Mood and Affect: Mood normal.         Behavior: Behavior normal.         Thought Content: Thought content normal.         Judgment: Judgment normal.         Recent Lab Results:  Lab Results   Component Value Date     (H) 10/17/2018     Lab Results   Component Value Date    CHOL 151 05/27/2020    TRIG 113 05/27/2020    HDL 46 05/27/2020    VLDL 22.6 05/27/2020    LDLHDL 1.79 05/27/2020       Assessment/Plan #1 monitor blood pressure #2 monitor blood sugar #3 continue present diet and activity levels #4 labs  Age-appropriate Screening Schedule:  Refer to the list below for future screening recommendations based on patient's age, sex and/or medical conditions.      Health Maintenance   Topic Date Due   • LIPID PANEL  05/27/2021   • TDAP/TD VACCINES (2 - Td) 11/19/2023   • COLONOSCOPY  04/18/2026   • INFLUENZA VACCINE  Completed   • ZOSTER VACCINE  Completed       Medicare Risks and Personalized Health Plan:  na      CMS-Preventive Services Quick Reference  Medicare Preventive Services Addressed:  [place]ERO[place]    Advance Care Planning:  ACP discussion was held with the patient during this visit.  Patient has an advance directive in EMR which is still valid.     Diagnoses and all orders for this visit:    1. Medicare annual wellness visit, subsequent (Primary)    2. Vitamin D deficiency, unspecified   -     CBC (No Diff); Future  -     Comprehensive Metabolic Panel  -     Lipid Panel  -     Vitamin D 25 Hydroxy  -     CBC (No Diff)    3. Mixed hyperlipidemia  -     CBC (No Diff); Future  -     Comprehensive Metabolic Panel  -     Lipid Panel  -     Vitamin D 25 Hydroxy  -     CBC (No Diff)    4. Impaired fasting glucose  -     CBC (No Diff); Future  -     Comprehensive Metabolic Panel  -     Lipid Panel  -     Vitamin D 25 Hydroxy  -     Hemoglobin A1c  -     CBC (No Diff)        An After Visit Summary and PPPS with all of these plans were given to the patient.      Follow Up:  Return in about 6 months (around 5/24/2021), or if symptoms worsen or fail to improve, for Recheck.

## 2021-02-14 ENCOUNTER — IMMUNIZATION (OUTPATIENT)
Dept: VACCINE CLINIC | Facility: HOSPITAL | Age: 76
End: 2021-02-14

## 2021-02-14 PROCEDURE — 0001A: CPT | Performed by: INTERNAL MEDICINE

## 2021-02-14 PROCEDURE — 91300 HC SARSCOV02 VAC 30MCG/0.3ML IM: CPT | Performed by: INTERNAL MEDICINE

## 2021-03-07 ENCOUNTER — IMMUNIZATION (OUTPATIENT)
Dept: VACCINE CLINIC | Facility: HOSPITAL | Age: 76
End: 2021-03-07

## 2021-03-07 PROCEDURE — 91300 HC SARSCOV02 VAC 30MCG/0.3ML IM: CPT | Performed by: INTERNAL MEDICINE

## 2021-03-07 PROCEDURE — 0002A: CPT | Performed by: INTERNAL MEDICINE

## 2021-05-25 ENCOUNTER — OFFICE VISIT (OUTPATIENT)
Dept: FAMILY MEDICINE CLINIC | Facility: CLINIC | Age: 76
End: 2021-05-25

## 2021-05-25 ENCOUNTER — LAB (OUTPATIENT)
Dept: LAB | Facility: HOSPITAL | Age: 76
End: 2021-05-25

## 2021-05-25 VITALS
HEIGHT: 75 IN | HEART RATE: 55 BPM | BODY MASS INDEX: 35.56 KG/M2 | TEMPERATURE: 97.1 F | SYSTOLIC BLOOD PRESSURE: 110 MMHG | RESPIRATION RATE: 16 BRPM | WEIGHT: 286 LBS | DIASTOLIC BLOOD PRESSURE: 60 MMHG | OXYGEN SATURATION: 97 %

## 2021-05-25 DIAGNOSIS — E78.2 MIXED HYPERLIPIDEMIA: ICD-10-CM

## 2021-05-25 DIAGNOSIS — R79.0 LOW SERUM COPPER FOR AGE: ICD-10-CM

## 2021-05-25 DIAGNOSIS — C61 PROSTATE CANCER (HCC): Chronic | ICD-10-CM

## 2021-05-25 DIAGNOSIS — R73.01 IMPAIRED FASTING GLUCOSE: ICD-10-CM

## 2021-05-25 DIAGNOSIS — E61.1 LOW IRON: ICD-10-CM

## 2021-05-25 DIAGNOSIS — E55.9 VITAMIN D DEFICIENCY, UNSPECIFIED: ICD-10-CM

## 2021-05-25 DIAGNOSIS — E78.2 MIXED HYPERLIPIDEMIA: Primary | ICD-10-CM

## 2021-05-25 LAB
25(OH)D3 SERPL-MCNC: 31.2 NG/ML (ref 30–100)
ALBUMIN SERPL-MCNC: 4 G/DL (ref 3.5–5.2)
ALBUMIN/GLOB SERPL: 1.4 G/DL
ALP SERPL-CCNC: 79 U/L (ref 39–117)
ALT SERPL W P-5'-P-CCNC: 16 U/L (ref 1–41)
ANION GAP SERPL CALCULATED.3IONS-SCNC: 10.2 MMOL/L (ref 5–15)
AST SERPL-CCNC: 27 U/L (ref 1–40)
BILIRUB SERPL-MCNC: 0.4 MG/DL (ref 0–1.2)
BUN SERPL-MCNC: 20 MG/DL (ref 8–23)
BUN/CREAT SERPL: 24.1 (ref 7–25)
CALCIUM SPEC-SCNC: 8.4 MG/DL (ref 8.6–10.5)
CHLORIDE SERPL-SCNC: 108 MMOL/L (ref 98–107)
CHOLEST SERPL-MCNC: 164 MG/DL (ref 0–200)
CO2 SERPL-SCNC: 21.8 MMOL/L (ref 22–29)
CREAT SERPL-MCNC: 0.83 MG/DL (ref 0.76–1.27)
DEPRECATED RDW RBC AUTO: 41.8 FL (ref 37–54)
ERYTHROCYTE [DISTWIDTH] IN BLOOD BY AUTOMATED COUNT: 12.7 % (ref 12.3–15.4)
FERRITIN SERPL-MCNC: 136 NG/ML (ref 30–400)
GFR SERPL CREATININE-BSD FRML MDRD: 90 ML/MIN/1.73
GLOBULIN UR ELPH-MCNC: 2.8 GM/DL
GLUCOSE SERPL-MCNC: 97 MG/DL (ref 65–99)
HBA1C MFR BLD: 5.51 % (ref 4.8–5.6)
HCT VFR BLD AUTO: 43.8 % (ref 37.5–51)
HDLC SERPL-MCNC: 53 MG/DL (ref 40–60)
HGB BLD-MCNC: 15.1 G/DL (ref 13–17.7)
LDLC SERPL CALC-MCNC: 87 MG/DL (ref 0–100)
LDLC/HDLC SERPL: 1.57 {RATIO}
MCH RBC QN AUTO: 31.3 PG (ref 26.6–33)
MCHC RBC AUTO-ENTMCNC: 34.5 G/DL (ref 31.5–35.7)
MCV RBC AUTO: 90.9 FL (ref 79–97)
PLATELET # BLD AUTO: 132 10*3/MM3 (ref 140–450)
PMV BLD AUTO: 12.7 FL (ref 6–12)
POTASSIUM SERPL-SCNC: 4 MMOL/L (ref 3.5–5.2)
PROT SERPL-MCNC: 6.8 G/DL (ref 6–8.5)
RBC # BLD AUTO: 4.82 10*6/MM3 (ref 4.14–5.8)
SODIUM SERPL-SCNC: 140 MMOL/L (ref 136–145)
TRIGL SERPL-MCNC: 140 MG/DL (ref 0–150)
VLDLC SERPL-MCNC: 24 MG/DL (ref 5–40)
WBC # BLD AUTO: 3.85 10*3/MM3 (ref 3.4–10.8)

## 2021-05-25 PROCEDURE — 99214 OFFICE O/P EST MOD 30 MIN: CPT | Performed by: INTERNAL MEDICINE

## 2021-05-25 PROCEDURE — 82306 VITAMIN D 25 HYDROXY: CPT | Performed by: INTERNAL MEDICINE

## 2021-05-25 PROCEDURE — 80061 LIPID PANEL: CPT | Performed by: INTERNAL MEDICINE

## 2021-05-25 PROCEDURE — 85027 COMPLETE CBC AUTOMATED: CPT

## 2021-05-25 PROCEDURE — 83036 HEMOGLOBIN GLYCOSYLATED A1C: CPT | Performed by: INTERNAL MEDICINE

## 2021-05-25 PROCEDURE — 80053 COMPREHEN METABOLIC PANEL: CPT | Performed by: INTERNAL MEDICINE

## 2021-05-25 PROCEDURE — 82525 ASSAY OF COPPER: CPT

## 2021-05-25 PROCEDURE — 36415 COLL VENOUS BLD VENIPUNCTURE: CPT | Performed by: INTERNAL MEDICINE

## 2021-05-25 PROCEDURE — 82728 ASSAY OF FERRITIN: CPT | Performed by: INTERNAL MEDICINE

## 2021-05-25 NOTE — PROGRESS NOTES
Subjective   Ramiro Osborne is a 76 y.o. male.     Vitals:    05/25/21 0942   BP: 110/60   Pulse: 55   Resp: 16   Temp: 97.1 °F (36.2 °C)   SpO2: 97%      Body mass index is 35.75 kg/m².     History of Present Illness   Patient was seen for hyperlipidemia.  Patient is using diet and exercise to control his levels.  Triglycerides 140, HDL 53, LDL 97.  Patient will continue present treatment.  Patient does have prostate cancer is being treated by urology.  Patient was told that he need to be on a vegan diet and patient investigated and saw a needed iron and copper supplement.  His copper and iron levels will be drawn today.  Patient will follow up in 4 days.  Patient does have a history of impaired glucose.  Blood sugars are running in the 90s.  Hemoglobin A1c was 5.5%.  Patient will continue present treatment.  Patient does have a vitamin D3 deficiency and is using 3000 units of vitamin D daily.  Patient's levels are pending at time of dictation.  Patient will continue present treatment and follow-up in 6 months.    Dictated utilizing Dragon dictation. If there are questions or for further clarification, please contact me.  The following portions of the patient's history were reviewed and updated as appropriate: allergies, current medications, past family history, past medical history, past social history, past surgical history and problem list.    Review of Systems   Constitutional: Negative for fatigue and fever.   HENT: Positive for congestion. Negative for trouble swallowing.    Eyes: Negative for discharge and visual disturbance.   Respiratory: Negative for choking and shortness of breath.    Cardiovascular: Negative for chest pain and palpitations.   Gastrointestinal: Negative for abdominal pain and blood in stool.   Endocrine: Negative.    Genitourinary: Negative for genital sores and hematuria.   Musculoskeletal: Negative for gait problem and joint swelling.   Skin: Negative for color change, pallor, rash  and wound.   Allergic/Immunologic: Positive for environmental allergies. Negative for immunocompromised state.   Neurological: Negative for facial asymmetry and speech difficulty.   Psychiatric/Behavioral: Negative for hallucinations and suicidal ideas.       Objective   Physical Exam  Vitals and nursing note reviewed.   Constitutional:       Appearance: Normal appearance. He is well-developed.   HENT:      Head: Normocephalic and atraumatic.      Nose: Nose normal.      Mouth/Throat:      Mouth: Mucous membranes are moist.      Pharynx: Oropharynx is clear.   Eyes:      Extraocular Movements: Extraocular movements intact.      Conjunctiva/sclera: Conjunctivae normal.      Pupils: Pupils are equal, round, and reactive to light.   Cardiovascular:      Rate and Rhythm: Normal rate and regular rhythm.      Heart sounds: Normal heart sounds. No murmur heard.   No friction rub. No gallop.    Pulmonary:      Effort: Pulmonary effort is normal. No respiratory distress.      Breath sounds: Normal breath sounds. No wheezing, rhonchi or rales.   Chest:      Chest wall: No tenderness.   Abdominal:      General: Bowel sounds are normal.      Palpations: Abdomen is soft.   Musculoskeletal:         General: Normal range of motion.      Cervical back: Normal range of motion and neck supple.   Skin:     General: Skin is warm and dry.   Neurological:      General: No focal deficit present.      Mental Status: He is alert and oriented to person, place, and time. Mental status is at baseline.   Psychiatric:         Mood and Affect: Mood normal.         Behavior: Behavior normal.         Thought Content: Thought content normal.         Judgment: Judgment normal.         Assessment/Plan #1 labs #2 continue present diet and activity levels continue treatment with urology.  Problems Addressed this Visit     Endocrine and Metabolic            Impaired fasting glucose    Relevant Orders    CBC (No Diff) (Completed)    Comprehensive  Metabolic Panel (Completed)    Ferritin (Completed)    Lipid Panel (Completed)    Vitamin D 25 Hydroxy (Completed)    Hemoglobin A1c (Completed)          Hematology and Neoplasia            Prostate cancer (CMS/HCC) (Chronic)            Other Visit Diagnoses     Mixed hyperlipidemia    -  Primary    Relevant Orders    CBC (No Diff) (Completed)    Comprehensive Metabolic Panel (Completed)    Ferritin (Completed)    Lipid Panel (Completed)    Vitamin D 25 Hydroxy (Completed)    Hemoglobin A1c (Completed)    Vitamin D deficiency, unspecified        Relevant Orders    CBC (No Diff) (Completed)    Comprehensive Metabolic Panel (Completed)    Ferritin (Completed)    Lipid Panel (Completed)    Vitamin D 25 Hydroxy (Completed)    Hemoglobin A1c (Completed)    Low iron        Relevant Orders    CBC (No Diff) (Completed)    Comprehensive Metabolic Panel (Completed)    Ferritin (Completed)    Lipid Panel (Completed)    Vitamin D 25 Hydroxy (Completed)    Hemoglobin A1c (Completed)    Low serum copper for age        Relevant Orders    Copper, Free      Diagnoses     Diagnosis Codes Comments    Mixed hyperlipidemia    -  Primary ICD-10-CM: E78.2  ICD-9-CM: 272.2     Vitamin D deficiency, unspecified     ICD-10-CM: E55.9  ICD-9-CM: 268.9     Impaired fasting glucose     ICD-10-CM: R73.01  ICD-9-CM: 790.21     Low iron     ICD-10-CM: E61.1  ICD-9-CM: 280.9     Low serum copper for age     ICD-10-CM: R79.0  ICD-9-CM: 790.6     Prostate cancer (CMS/HCC)     ICD-10-CM: C61  ICD-9-CM: 185

## 2021-06-01 LAB — COPPER SERPL-MCNC: 320 MCG/L

## 2021-09-03 ENCOUNTER — IMMUNIZATION (OUTPATIENT)
Dept: VACCINE CLINIC | Facility: HOSPITAL | Age: 76
End: 2021-09-03

## 2021-09-03 PROCEDURE — 91300 HC SARSCOV02 VAC 30MCG/0.3ML IM: CPT | Performed by: INTERNAL MEDICINE

## 2021-09-03 PROCEDURE — 0003A: CPT | Performed by: INTERNAL MEDICINE

## 2021-11-18 DIAGNOSIS — Z12.5 PROSTATE CANCER SCREENING: Primary | ICD-10-CM

## 2021-11-18 DIAGNOSIS — E61.1 LOW IRON: ICD-10-CM

## 2021-11-18 DIAGNOSIS — E55.9 VITAMIN D DEFICIENCY, UNSPECIFIED: ICD-10-CM

## 2021-11-18 DIAGNOSIS — E78.2 MIXED HYPERLIPIDEMIA: ICD-10-CM

## 2021-11-19 ENCOUNTER — LAB (OUTPATIENT)
Dept: FAMILY MEDICINE CLINIC | Facility: CLINIC | Age: 76
End: 2021-11-19

## 2021-11-19 DIAGNOSIS — E61.1 LOW IRON: ICD-10-CM

## 2021-11-19 DIAGNOSIS — E78.2 MIXED HYPERLIPIDEMIA: ICD-10-CM

## 2021-11-19 DIAGNOSIS — E55.9 VITAMIN D DEFICIENCY, UNSPECIFIED: ICD-10-CM

## 2021-11-19 DIAGNOSIS — Z12.5 PROSTATE CANCER SCREENING: ICD-10-CM

## 2021-11-19 DIAGNOSIS — E78.2 MIXED HYPERLIPIDEMIA: Primary | ICD-10-CM

## 2021-11-19 LAB
25(OH)D3 SERPL-MCNC: 39.8 NG/ML (ref 30–100)
ALBUMIN SERPL-MCNC: 4 G/DL (ref 3.5–5.2)
ALBUMIN/GLOB SERPL: 1.4 G/DL
ALP SERPL-CCNC: 87 U/L (ref 39–117)
ALT SERPL W P-5'-P-CCNC: 13 U/L (ref 1–41)
ANION GAP SERPL CALCULATED.3IONS-SCNC: 10.7 MMOL/L (ref 5–15)
AST SERPL-CCNC: 23 U/L (ref 1–40)
BILIRUB SERPL-MCNC: 0.4 MG/DL (ref 0–1.2)
BUN SERPL-MCNC: 20 MG/DL (ref 8–23)
BUN/CREAT SERPL: 23.5 (ref 7–25)
CALCIUM SPEC-SCNC: 9.2 MG/DL (ref 8.6–10.5)
CHLORIDE SERPL-SCNC: 105 MMOL/L (ref 98–107)
CHOLEST SERPL-MCNC: 182 MG/DL (ref 0–200)
CO2 SERPL-SCNC: 24.3 MMOL/L (ref 22–29)
CREAT SERPL-MCNC: 0.85 MG/DL (ref 0.76–1.27)
DEPRECATED RDW RBC AUTO: 41.3 FL (ref 37–54)
ERYTHROCYTE [DISTWIDTH] IN BLOOD BY AUTOMATED COUNT: 12.5 % (ref 12.3–15.4)
FERRITIN SERPL-MCNC: 150 NG/ML (ref 30–400)
GFR SERPL CREATININE-BSD FRML MDRD: 88 ML/MIN/1.73
GLOBULIN UR ELPH-MCNC: 2.9 GM/DL
GLUCOSE SERPL-MCNC: 97 MG/DL (ref 65–99)
HCT VFR BLD AUTO: 44.6 % (ref 37.5–51)
HDLC SERPL-MCNC: 50 MG/DL (ref 40–60)
HGB BLD-MCNC: 15.3 G/DL (ref 13–17.7)
LDLC SERPL CALC-MCNC: 109 MG/DL (ref 0–100)
LDLC/HDLC SERPL: 2.12 {RATIO}
MCH RBC QN AUTO: 30.9 PG (ref 26.6–33)
MCHC RBC AUTO-ENTMCNC: 34.3 G/DL (ref 31.5–35.7)
MCV RBC AUTO: 90.1 FL (ref 79–97)
PLATELET # BLD AUTO: 151 10*3/MM3 (ref 140–450)
PMV BLD AUTO: 14 FL (ref 6–12)
POTASSIUM SERPL-SCNC: 4.5 MMOL/L (ref 3.5–5.2)
PROT SERPL-MCNC: 6.9 G/DL (ref 6–8.5)
PSA SERPL-MCNC: 0.16 NG/ML (ref 0–4)
RBC # BLD AUTO: 4.95 10*6/MM3 (ref 4.14–5.8)
SODIUM SERPL-SCNC: 140 MMOL/L (ref 136–145)
TRIGL SERPL-MCNC: 131 MG/DL (ref 0–150)
VLDLC SERPL-MCNC: 23 MG/DL (ref 5–40)
WBC NRBC COR # BLD: 4.06 10*3/MM3 (ref 3.4–10.8)

## 2021-11-19 PROCEDURE — 80061 LIPID PANEL: CPT | Performed by: INTERNAL MEDICINE

## 2021-11-19 PROCEDURE — 82306 VITAMIN D 25 HYDROXY: CPT | Performed by: INTERNAL MEDICINE

## 2021-11-19 PROCEDURE — 85027 COMPLETE CBC AUTOMATED: CPT | Performed by: INTERNAL MEDICINE

## 2021-11-19 PROCEDURE — 82728 ASSAY OF FERRITIN: CPT | Performed by: INTERNAL MEDICINE

## 2021-11-19 PROCEDURE — G0103 PSA SCREENING: HCPCS | Performed by: INTERNAL MEDICINE

## 2021-11-19 PROCEDURE — 80053 COMPREHEN METABOLIC PANEL: CPT | Performed by: INTERNAL MEDICINE

## 2021-11-19 PROCEDURE — 36415 COLL VENOUS BLD VENIPUNCTURE: CPT | Performed by: INTERNAL MEDICINE

## 2021-12-01 ENCOUNTER — OFFICE VISIT (OUTPATIENT)
Dept: FAMILY MEDICINE CLINIC | Facility: CLINIC | Age: 76
End: 2021-12-01

## 2021-12-01 VITALS
SYSTOLIC BLOOD PRESSURE: 130 MMHG | HEIGHT: 75 IN | OXYGEN SATURATION: 97 % | WEIGHT: 279.6 LBS | BODY MASS INDEX: 34.76 KG/M2 | DIASTOLIC BLOOD PRESSURE: 82 MMHG | TEMPERATURE: 97.5 F | HEART RATE: 59 BPM

## 2021-12-01 DIAGNOSIS — R68.89 EXCESSIVE COPPER INTAKE: ICD-10-CM

## 2021-12-01 DIAGNOSIS — E61.1 LOW IRON: ICD-10-CM

## 2021-12-01 DIAGNOSIS — Z00.00 MEDICARE ANNUAL WELLNESS VISIT, SUBSEQUENT: Primary | ICD-10-CM

## 2021-12-01 DIAGNOSIS — C61 PROSTATE CANCER (HCC): Chronic | ICD-10-CM

## 2021-12-01 DIAGNOSIS — E55.9 VITAMIN D DEFICIENCY, UNSPECIFIED: ICD-10-CM

## 2021-12-01 DIAGNOSIS — E78.2 MIXED HYPERLIPIDEMIA: ICD-10-CM

## 2021-12-01 PROCEDURE — 99214 OFFICE O/P EST MOD 30 MIN: CPT | Performed by: INTERNAL MEDICINE

## 2021-12-01 PROCEDURE — 1159F MED LIST DOCD IN RCRD: CPT | Performed by: INTERNAL MEDICINE

## 2021-12-01 PROCEDURE — 36415 COLL VENOUS BLD VENIPUNCTURE: CPT | Performed by: INTERNAL MEDICINE

## 2021-12-01 PROCEDURE — G0439 PPPS, SUBSEQ VISIT: HCPCS | Performed by: INTERNAL MEDICINE

## 2021-12-01 PROCEDURE — 1126F AMNT PAIN NOTED NONE PRSNT: CPT | Performed by: INTERNAL MEDICINE

## 2021-12-01 PROCEDURE — 1170F FXNL STATUS ASSESSED: CPT | Performed by: INTERNAL MEDICINE

## 2021-12-01 NOTE — PROGRESS NOTES
QUICK REFERENCE INFORMATION:  The ABCs of the Annual Wellness Visit    Subsequent Medicare Wellness Visit patient was seen for Medicare wellness exam.  Patient was seen for hyperlipidemia.  Patient is using diet exercise.  Triglycerides 131, HDL 50, .  Patient will adhere better to a low-fat diet.  Patient does have a history of prostate cancer is being treated with Eligard subcu.  Patient is having a lot of malaise and fatigue from his testosterone suppression.  Patient does have a vitamin D3 deficiency is using over-the-counter D3.  Patient did have a copper excess from taking too much copper.  Copper levels are being drawn today.    Dictated utilizing Dragon dictation. If there are questions or for further clarification, please contact me.    HEALTH RISK ASSESSMENT    1945    Recent Hospitalizations:  No hospitalization(s) within the last year..        Current Medical Providers:  Patient Care Team:  Kris Man MD as PCP - General (Internal Medicine)  Kris Domingo MD as Consulting Physician (Urology)  Ravi Turner MD as Consulting Physician (Dermatology)  Jessika Escobar MD as Consulting Physician (Ophthalmology)        Smoking Status:  Social History     Tobacco Use   Smoking Status Former Smoker   • Types: Cigarettes   Smokeless Tobacco Never Used   Tobacco Comment    Quit in the 1980s       Alcohol Consumption:  Social History     Substance and Sexual Activity   Alcohol Use Not Currently       Depression Screen:   PHQ-2/PHQ-9 Depression Screening 12/1/2021   Little interest or pleasure in doing things 0   Feeling down, depressed, or hopeless 0   Trouble falling or staying asleep, or sleeping too much 0   Feeling tired or having little energy 0   Poor appetite or overeating 0   Feeling bad about yourself - or that you are a failure or have let yourself or your family down 0   Trouble concentrating on things, such as reading the newspaper or watching television 0   Moving or  speaking so slowly that other people could have noticed. Or the opposite - being so fidgety or restless that you have been moving around a lot more than usual 0   Thoughts that you would be better off dead, or of hurting yourself in some way 0   Total Score 0   If you checked off any problems, how difficult have these problems made it for you to do your work, take care of things at home, or get along with other people? Not difficult at all       Health Habits and Functional and Cognitive Screening:  Functional & Cognitive Status 12/1/2021   Do you have difficulty preparing food and eating? No   Do you have difficulty bathing yourself, getting dressed or grooming yourself? No   Do you have difficulty using the toilet? No   Do you have difficulty moving around from place to place? No   Do you have trouble with steps or getting out of a bed or a chair? No   Current Diet Well Balanced Diet   Dental Exam Up to date        Dental Exam Comment -   Eye Exam Up to date        Eye Exam Comment -   Exercise (times per week) 3 times per week   Current Exercises Include Aerobics   Current Exercise Activities Include -   Do you need help using the phone?  No   Are you deaf or do you have serious difficulty hearing?  No   Do you need help with transportation? No   Do you need help shopping? No   Do you need help preparing meals?  No   Do you need help with housework?  No   Do you need help with laundry? No   Do you need help taking your medications? No   Do you need help managing money? No   Do you ever drive or ride in a car without wearing a seat belt? No   Have you felt unusual stress, anger or loneliness in the last month? No   Who do you live with? Spouse   If you need help, do you have trouble finding someone available to you? No   Have you been bothered in the last four weeks by sexual problems? No   Do you have difficulty concentrating, remembering or making decisions? No           Does the patient have evidence of  cognitive impairment? No    Aspirin use counseling: Does not need ASA (and currently is not on it)      Recent Lab Results:  CMP:  Lab Results   Component Value Date    BUN 20 11/19/2021    CREATININE 0.85 11/19/2021    EGFRIFNONA 88 11/19/2021    EGFRIFAFRI 106 10/17/2018    BCR 23.5 11/19/2021     11/19/2021    K 4.5 11/19/2021    CO2 24.3 11/19/2021    CALCIUM 9.2 11/19/2021    PROTENTOTREF 6.5 10/17/2018    ALBUMIN 4.00 11/19/2021    LABGLOBREF 2.5 10/17/2018    LABIL2 1.6 10/17/2018    BILITOT 0.4 11/19/2021    ALKPHOS 87 11/19/2021    AST 23 11/19/2021    ALT 13 11/19/2021     Lipid Panel:  Lab Results   Component Value Date    CHOL 182 11/19/2021    TRIG 131 11/19/2021    HDL 50 11/19/2021    VLDL 23 11/19/2021    LDLHDL 2.12 11/19/2021     HbA1c:  Lab Results   Component Value Date    HGBA1C 5.51 05/25/2021       Visual Acuity:  No exam data present    Age-appropriate Screening Schedule:  Refer to the list below for future screening recommendations based on patient's age, sex and/or medical conditions. Orders for these recommended tests are listed in the plan section. The patient has been provided with a written plan.    Health Maintenance   Topic Date Due   • LIPID PANEL  11/19/2022   • TDAP/TD VACCINES (2 - Td or Tdap) 11/19/2023   • INFLUENZA VACCINE  Completed   • ZOSTER VACCINE  Completed        Subjective   History of Present Illness    Ramiro Osborne is a 76 y.o. male who presents for an Subsequent Wellness Visit.    The following portions of the patient's history were reviewed and updated as appropriate: allergies, current medications, past family history, past medical history, past social history, past surgical history and problem list.    Outpatient Medications Prior to Visit   Medication Sig Dispense Refill   • Calcium Carbonate-Vit D-Min (CALCIUM 1200 PO) Take 3,000 Units by mouth.     • cholecalciferol (VITAMIN D3) 25 MCG (1000 UT) tablet Take 1,000 Units by mouth Daily.     • ciclopirox  (LOPROX) 1 % shampoo      • Denosumab (PROLIA SC) Inject  under the skin into the appropriate area as directed.     • Ferrous Sulfate (IRON PO) Take  by mouth.     • fluocinolone (SYNALAR) 0.01 % external solution as needed.     • Leuprolide Acetate (ELIGARD SC) Inject  under the skin into the appropriate area as directed.     • Magnesium 400 MG capsule Take  by mouth Daily.     • Multiple Vitamins-Minerals (MULTI FOR HIM 50+ PO) Take  by mouth.     • Copper Gluconate (COPPER CAPS PO) Take  by mouth.     • ketoconazole (NIZORAL) 2 % cream Apply  topically Daily.     • mupirocin (BACTROBAN) 2 % ointment Apply  topically to the appropriate area as directed 3 (Three) Times a Day. 15 g 0     No facility-administered medications prior to visit.       Patient Active Problem List   Diagnosis   • Prostate cancer (HCC)   • H/O colonoscopy with polypectomy   • Aspiration pneumonia of right middle lobe (HCC)   • Aspiration of foreign body   • Skin infection   • Tinnitus   • Blood in stool   • Impaired fasting glucose   • Idiopathic scoliosis       Advance Care Planning:  ACP discussion was held with the patient during this visit. Patient has an advance directive in EMR which is still valid.     Identification of Risk Factors:  Risk factors include: na.    Review of Systems   Constitutional: Positive for fatigue. Negative for fever.   HENT: Positive for congestion. Negative for trouble swallowing.    Eyes: Negative for discharge and visual disturbance.   Respiratory: Negative for choking and shortness of breath.    Cardiovascular: Negative for chest pain and palpitations.   Gastrointestinal: Negative for abdominal pain and blood in stool.   Endocrine: Negative.    Genitourinary: Negative for genital sores and hematuria.   Musculoskeletal: Negative for gait problem and joint swelling.   Skin: Negative for color change, pallor, rash and wound.   Allergic/Immunologic: Positive for environmental allergies. Negative for  immunocompromised state.   Neurological: Negative for facial asymmetry and speech difficulty.   Psychiatric/Behavioral: Negative for hallucinations and suicidal ideas.       Compared to one year ago, the patient feels his physical health is the same.  Compared to one year ago, the patient feels his mental health is the same.    Objective     Physical Exam  Vitals and nursing note reviewed.   Constitutional:       Appearance: Normal appearance. He is well-developed.   HENT:      Head: Normocephalic and atraumatic.      Nose: Nose normal.      Mouth/Throat:      Mouth: Mucous membranes are moist.      Pharynx: Oropharynx is clear.   Eyes:      Extraocular Movements: Extraocular movements intact.      Conjunctiva/sclera: Conjunctivae normal.      Pupils: Pupils are equal, round, and reactive to light.   Cardiovascular:      Rate and Rhythm: Normal rate and regular rhythm.      Heart sounds: Normal heart sounds. No murmur heard.  No friction rub. No gallop.    Pulmonary:      Effort: Pulmonary effort is normal. No respiratory distress.      Breath sounds: Normal breath sounds. No stridor. No wheezing, rhonchi or rales.   Chest:      Chest wall: No tenderness.   Abdominal:      General: Bowel sounds are normal.      Palpations: Abdomen is soft.   Musculoskeletal:         General: Normal range of motion.      Cervical back: Normal range of motion and neck supple.   Skin:     General: Skin is warm and dry.   Neurological:      General: No focal deficit present.      Mental Status: He is alert and oriented to person, place, and time. Mental status is at baseline.   Psychiatric:         Mood and Affect: Mood normal.         Behavior: Behavior normal.         Thought Content: Thought content normal.         Judgment: Judgment normal.         Vitals:    12/01/21 0948   BP: 130/82   BP Location: Left arm   Patient Position: Sitting   Cuff Size: Adult   Pulse: 59   Temp: 97.5 °F (36.4 °C)   TempSrc: Temporal   SpO2: 97%  "  Weight: 127 kg (279 lb 9.6 oz)   Height: 190.5 cm (75\")   PainSc: 0-No pain       Patient's Body mass index is 34.95 kg/m². indicating that he is within normal range (BMI 18.5-24.9). No BMI management plan needed..      Assessment/Plan  #1 labs #2 continue activity but stricter adherence to low-fat diet #3 follow-up in 6 months  Patient Self-Management and Personalized Health Advice  The patient has been provided with information about: NA and preventive services including:   · NA.    Visit Diagnoses:    ICD-10-CM ICD-9-CM   1. Medicare annual wellness visit, subsequent  Z00.00 V70.0   2. Excessive copper intake  R68.89 796.4   3. Mixed hyperlipidemia  E78.2 272.2   4. Vitamin D deficiency, unspecified  E55.9 268.9   5. Low iron  E61.1 280.9   6. Prostate cancer (HCC)  C61 185       Orders Placed This Encounter   Procedures   • Copper, Free     Standing Status:   Future     Number of Occurrences:   1     Standing Expiration Date:   12/1/2022     Order Specific Question:   Release to patient     Answer:   Immediate       Outpatient Encounter Medications as of 12/1/2021   Medication Sig Dispense Refill   • Calcium Carbonate-Vit D-Min (CALCIUM 1200 PO) Take 3,000 Units by mouth.     • cholecalciferol (VITAMIN D3) 25 MCG (1000 UT) tablet Take 1,000 Units by mouth Daily.     • ciclopirox (LOPROX) 1 % shampoo      • Denosumab (PROLIA SC) Inject  under the skin into the appropriate area as directed.     • Ferrous Sulfate (IRON PO) Take  by mouth.     • fluocinolone (SYNALAR) 0.01 % external solution as needed.     • Leuprolide Acetate (ELIGARD SC) Inject  under the skin into the appropriate area as directed.     • Magnesium 400 MG capsule Take  by mouth Daily.     • Multiple Vitamins-Minerals (MULTI FOR HIM 50+ PO) Take  by mouth.     • [DISCONTINUED] Copper Gluconate (COPPER CAPS PO) Take  by mouth.     • [DISCONTINUED] ketoconazole (NIZORAL) 2 % cream Apply  topically Daily.     • [DISCONTINUED] mupirocin (BACTROBAN) 2 " % ointment Apply  topically to the appropriate area as directed 3 (Three) Times a Day. 15 g 0     No facility-administered encounter medications on file as of 12/1/2021.       Reviewed use of high risk medication in the elderly: not applicable  Reviewed for potential of harmful drug interactions in the elderly: not applicable    Follow Up:  Return in about 6 months (around 6/1/2022), or if symptoms worsen or fail to improve, for Recheck.     An After Visit Summary and PPPS with all of these plans were given to the patient.

## 2021-12-01 NOTE — PATIENT INSTRUCTIONS
Medicare Wellness  Personal Prevention Plan of Service     Date of Office Visit:  2021  Encounter Provider:  Kris Man MD  Place of Service:  [facility]  Patient Name: Ramiro Osborne  :  1945    As part of the Medicare Wellness portion of your visit today, we are providing you with this personalized preventive plan of services (PPPS). This plan is based upon recommendations of the [place] Preventive Services Task Force (USPSTF) and the Advisory Committee on Immunization Practices (ACIP).    This lists the preventive care services that should be considered, and provides dates of when you are due. Items listed as completed are up-to-date and do not require any further intervention.    Health Maintenance   Topic Date Due   • LIPID PANEL  2022   • ANNUAL WELLNESS VISIT  2022   • TDAP/TD VACCINES (2 - Td or Tdap) 2023   • COLORECTAL CANCER SCREENING  2026   • HEPATITIS C SCREENING  Completed   • COVID-19 Vaccine  Completed   • INFLUENZA VACCINE  Completed   • Pneumococcal Vaccine 65+  Completed   • AAA SCREEN (ONE-TIME)  Completed   • ZOSTER VACCINE  Completed       No orders of the defined types were placed in this encounter.      No follow-ups on file.

## 2021-12-13 LAB — COPPER SERPL-MCNC: 410 MCG/L

## 2021-12-20 ENCOUNTER — TELEPHONE (OUTPATIENT)
Dept: FAMILY MEDICINE CLINIC | Facility: CLINIC | Age: 76
End: 2021-12-20

## 2021-12-20 DIAGNOSIS — R78.79 HIGH BLOOD COPPER LEVEL: Primary | ICD-10-CM

## 2021-12-20 NOTE — TELEPHONE ENCOUNTER
Pt was calling about referral for Hematology. I did not see referral. Is patient suppose to be referred to hematology group.

## 2022-01-02 NOTE — PROGRESS NOTES
REFERRING PROVIDER:    Kris Man MD  [address] [place] [address]    REASON FOR CONSULTATION:    Elevated plasma free copper level    HISTORY OF PRESENT ILLNESS:  Ramiro Osborne is a 76 y.o. male who is referred today for further evaluation of elevated plasma free copper level.    He has a history of Marysol 8 prostate cancer diagnosed in 2014.  He was treated with radiation and ADT.  He was able to stop his ADT for several years but then had a PSA recurrence and is now on Trelstar managed by urology.    He is on a vegan diet now.  Until about 6 months ago, for about 1 year he was on a copper supplement, taking 2 to 3 mg/day.        Free plasma copper level by MCP/MS was 320 on 5/25/2021 and was more elevated at 410 on 12/1/21. Less than 180 appears to be normal.     He notes some mild fatigue.  Otherwise he generally feels well.  He notes mild bruising.    Past Medical History:   Diagnosis Date   • Blood in stool    • History of vitamin D deficiency    • Hyperlipidemia    • Prostate cancer (HCC)    • Skin infection    • Tinnitus        Past Surgical History:   Procedure Laterality Date   • BRONCHOSCOPY Bilateral 1/22/2019    Procedure: BRONCHOSCOPY WITH BAL OF RIGHT LOWER LOBE AND RIGHT MIDDLE LOBE COMBINED;  Surgeon: Donnie Mcwilliams MD;  Location: [facility] ENDOSCOPY;  Service: Pulmonary   • COLONOSCOPY  02/2011    Within Normal Limits. Dr. Lema. 03/2007- 9mm polyps   • EYE ENUCLEATION     • PILONIDAL CYSTECTOMY     • WRIST SURGERY         SOCIAL HISTORY:   reports that he has quit smoking. His smoking use included cigarettes. He has never used smokeless tobacco. He reports previous alcohol use. He reports that he does not use drugs.    FAMILY HISTORY:  family history includes COPD in his mother; Cancer in his sister; Emphysema in his mother; Lung cancer in his father.    ALLERGIES:  No Known Allergies    MEDICATIONS:  The medication list has been reviewed with the patient by the medical  "assistant, and the list has been updated in the electronic medical record, which I reviewed.  Medication dosages and frequencies were confirmed to be accurate.    Review of Systems   Constitutional: Positive for fatigue.   Hematological: Bruises/bleeds easily.   All other systems reviewed and are negative.      Vitals:    01/03/22 0919   BP: 142/86   Pulse: 74   Resp: 16   Temp: 96.9 °F (36.1 °C)   TempSrc: Temporal   SpO2: 96%   Weight: 125 kg (275 lb)   Height: 190.5 cm (75\")   PainSc: 0-No pain  Comment: high blood copper level       Physical Exam  Vitals and nursing note reviewed.   Constitutional:       General: He is not in acute distress.     Appearance: He is well-developed.   HENT:      Head: Normocephalic and atraumatic. Hair is normal.      Comments: Wearing a facemask  Eyes:      General: Lids are normal.      Conjunctiva/sclera: Conjunctivae normal.      Pupils: Pupils are equal.   Neck:      Thyroid: No thyroid mass or thyromegaly.      Vascular: No JVD.   Cardiovascular:      Rate and Rhythm: Normal rate and regular rhythm.      Heart sounds: No murmur heard.  No friction rub. No gallop.    Pulmonary:      Effort: Pulmonary effort is normal. No respiratory distress.      Breath sounds: Normal breath sounds. No wheezing or rales.   Chest:   Breasts:      Right: No supraclavicular adenopathy.      Left: No supraclavicular adenopathy.       Abdominal:      General: There is no distension.      Palpations: Abdomen is soft. There is no hepatomegaly, splenomegaly or mass.      Tenderness: There is no abdominal tenderness. There is no guarding.   Musculoskeletal:         General: No tenderness or deformity. Normal range of motion.      Cervical back: Neck supple.   Lymphadenopathy:      Cervical: No cervical adenopathy.      Upper Body:      Right upper body: No supraclavicular adenopathy.      Left upper body: No supraclavicular adenopathy.   Skin:     General: Skin is warm and dry.      Findings: No rash. "      Comments: Mild purpura on his hands   Neurological:      Mental Status: He is alert and oriented to person, place, and time.   Psychiatric:         Behavior: Behavior normal.         Thought Content: Thought content normal.         Judgment: Judgment normal.         DIAGNOSTIC DATA:  CBC & Differential (01/03/2022 09:30)      IMAGING:    None reviewed    ASSESSMENT:  This is a 76 y.o. male with:    *Mild thrombocytopenia  • Platelet count chronically in the 130,000 range  • Check an immature platelet fraction today    *History of Marysol 8 prostate cancer  • Treated in 2014 with radiation and ADT  • PSA recurrence several years ago, now on Trelstar per urology    *Elevated free plasma copper level  • For about 1 year he was on a copper supplement at 2 to 3 mg/day until about 6 months ago  • Free plasma copper level by MCP/MS was 320 on 5/25/2021 and was more elevated at 410 on 12/1/21. Less than 180 appears to be normal.   • No signs or symptoms of Harpreet's disease  • It is unclear if this is pathologic for him at this time    PLAN:   1. I will check a serum copper level and also check a zinc level today as well  2. If the serum copper level remains elevated he might consider a zinc supplement if he is not on one and if his zinc level is not elevated  3. Check an immature platelet fraction today  4. Follow-up to be determined.  I will follow-up pending labs with him in the next few days when they return.    ORDERS PLACED DURING THIS ENCOUNTER  Orders Placed This Encounter   Procedures   • Copper, Serum     Order Specific Question:   Release to patient     Answer:   Immediate   • Immature Platelet Fraction     Order Specific Question:   Release to patient     Answer:   Immediate   • Zinc     Order Specific Question:   Release to patient     Answer:   Immediate

## 2022-01-03 ENCOUNTER — CONSULT (OUTPATIENT)
Dept: ONCOLOGY | Facility: CLINIC | Age: 77
End: 2022-01-03

## 2022-01-03 ENCOUNTER — LAB (OUTPATIENT)
Dept: LAB | Facility: HOSPITAL | Age: 77
End: 2022-01-03

## 2022-01-03 VITALS
HEIGHT: 75 IN | WEIGHT: 275 LBS | BODY MASS INDEX: 34.19 KG/M2 | RESPIRATION RATE: 16 BRPM | OXYGEN SATURATION: 96 % | TEMPERATURE: 96.9 F | SYSTOLIC BLOOD PRESSURE: 142 MMHG | HEART RATE: 74 BPM | DIASTOLIC BLOOD PRESSURE: 86 MMHG

## 2022-01-03 DIAGNOSIS — R78.79 HIGH BLOOD COPPER LEVEL: Primary | ICD-10-CM

## 2022-01-03 DIAGNOSIS — D69.6 THROMBOCYTOPENIA: ICD-10-CM

## 2022-01-03 LAB
BASOPHILS # BLD AUTO: 0.03 10*3/MM3 (ref 0–0.2)
BASOPHILS NFR BLD AUTO: 0.6 % (ref 0–1.5)
DEPRECATED RDW RBC AUTO: 41.2 FL (ref 37–54)
EOSINOPHIL # BLD AUTO: 0.14 10*3/MM3 (ref 0–0.4)
EOSINOPHIL NFR BLD AUTO: 2.8 % (ref 0.3–6.2)
ERYTHROCYTE [DISTWIDTH] IN BLOOD BY AUTOMATED COUNT: 12.4 % (ref 12.3–15.4)
HCT VFR BLD AUTO: 44.3 % (ref 37.5–51)
HGB BLD-MCNC: 14.8 G/DL (ref 13–17.7)
IMM GRANULOCYTES # BLD AUTO: 0.02 10*3/MM3 (ref 0–0.05)
IMM GRANULOCYTES NFR BLD AUTO: 0.4 % (ref 0–0.5)
LYMPHOCYTES # BLD AUTO: 1.07 10*3/MM3 (ref 0.7–3.1)
LYMPHOCYTES NFR BLD AUTO: 21.4 % (ref 19.6–45.3)
MCH RBC QN AUTO: 30.5 PG (ref 26.6–33)
MCHC RBC AUTO-ENTMCNC: 33.4 G/DL (ref 31.5–35.7)
MCV RBC AUTO: 91.2 FL (ref 79–97)
MONOCYTES # BLD AUTO: 0.73 10*3/MM3 (ref 0.1–0.9)
MONOCYTES NFR BLD AUTO: 14.6 % (ref 5–12)
NEUTROPHILS NFR BLD AUTO: 3.02 10*3/MM3 (ref 1.7–7)
NEUTROPHILS NFR BLD AUTO: 60.2 % (ref 42.7–76)
NRBC BLD AUTO-RTO: 0 /100 WBC (ref 0–0.2)
PLATELET # BLD AUTO: 137 10*3/MM3 (ref 140–450)
PLATELETS.RETICULATED NFR BLD AUTO: 11.5 % (ref 0.9–6.5)
PMV BLD AUTO: 13 FL (ref 6–12)
RBC # BLD AUTO: 4.86 10*6/MM3 (ref 4.14–5.8)
WBC NRBC COR # BLD: 5.01 10*3/MM3 (ref 3.4–10.8)

## 2022-01-03 PROCEDURE — 85055 RETICULATED PLATELET ASSAY: CPT | Performed by: INTERNAL MEDICINE

## 2022-01-03 PROCEDURE — 36415 COLL VENOUS BLD VENIPUNCTURE: CPT

## 2022-01-03 PROCEDURE — 99204 OFFICE O/P NEW MOD 45 MIN: CPT | Performed by: INTERNAL MEDICINE

## 2022-01-03 PROCEDURE — 85025 COMPLETE CBC W/AUTO DIFF WBC: CPT

## 2022-01-05 LAB
COPPER SERPL-MCNC: 110 UG/DL (ref 69–132)
ZINC SERPL-MCNC: 76 UG/DL (ref 44–115)

## 2022-02-10 ENCOUNTER — OFFICE VISIT (OUTPATIENT)
Dept: FAMILY MEDICINE CLINIC | Facility: CLINIC | Age: 77
End: 2022-02-10

## 2022-02-10 VITALS
SYSTOLIC BLOOD PRESSURE: 118 MMHG | BODY MASS INDEX: 33.55 KG/M2 | HEIGHT: 75 IN | HEART RATE: 60 BPM | WEIGHT: 269.8 LBS | DIASTOLIC BLOOD PRESSURE: 70 MMHG | OXYGEN SATURATION: 97 % | TEMPERATURE: 97.8 F

## 2022-02-10 DIAGNOSIS — C61 PROSTATE CANCER: Chronic | ICD-10-CM

## 2022-02-10 DIAGNOSIS — R68.89 EXCESSIVE COPPER INTAKE: Primary | ICD-10-CM

## 2022-02-10 PROCEDURE — 99213 OFFICE O/P EST LOW 20 MIN: CPT | Performed by: INTERNAL MEDICINE

## 2022-02-10 NOTE — PROGRESS NOTES
Subjective   Ramiro Osborne is a 76 y.o. male.     Vitals:    02/10/22 1452   BP: 118/70   Pulse: 60   Temp: 97.8 °F (36.6 °C)   SpO2: 97%      Body mass index is 33.72 kg/m².     History of Present Illness   Patient was seen for elevated copper levels.  Patient was taking copper as a supplement and blood work showed increased free copper levels.  Blood work showed normal serum copper levels.  Patient is having normal liver function test and kidney profiles.  Patient is also not anemic.  Copper levels will be drawn in 4 months.  Patient does have prostate cancer is being treated by urology.    Dictated utilizing Dragon dictation. If there are questions or for further clarification, please contact me.  The following portions of the patient's history were reviewed and updated as appropriate: allergies, current medications, past family history, past medical history, past social history, past surgical history and problem list.    Review of Systems   Constitutional: Negative for fatigue and fever.   HENT: Positive for congestion. Negative for trouble swallowing.    Eyes: Negative for discharge and visual disturbance.   Respiratory: Negative for choking and shortness of breath.    Cardiovascular: Negative for chest pain and palpitations.   Gastrointestinal: Negative for abdominal pain and blood in stool.   Endocrine: Negative.    Genitourinary: Negative for genital sores and hematuria.   Musculoskeletal: Negative for gait problem and joint swelling.   Skin: Negative for color change, pallor, rash and wound.   Allergic/Immunologic: Positive for environmental allergies. Negative for immunocompromised state.   Neurological: Negative for facial asymmetry and speech difficulty.   Psychiatric/Behavioral: Negative for hallucinations and suicidal ideas.       Objective   Physical Exam  Vitals and nursing note reviewed.   Constitutional:       Appearance: Normal appearance. He is well-developed.   HENT:      Head: Normocephalic  and atraumatic.      Nose: Nose normal.      Mouth/Throat:      Mouth: Mucous membranes are moist.      Pharynx: Oropharynx is clear.   Eyes:      Extraocular Movements: Extraocular movements intact.      Conjunctiva/sclera: Conjunctivae normal.      Pupils: Pupils are equal, round, and reactive to light.   Cardiovascular:      Rate and Rhythm: Normal rate and regular rhythm.      Heart sounds: Normal heart sounds. No murmur heard.  No friction rub. No gallop.    Pulmonary:      Effort: Pulmonary effort is normal. No respiratory distress.      Breath sounds: Normal breath sounds. No stridor. No wheezing, rhonchi or rales.   Chest:      Chest wall: No tenderness.   Abdominal:      General: Bowel sounds are normal.      Palpations: Abdomen is soft.   Musculoskeletal:         General: Normal range of motion.      Cervical back: Normal range of motion and neck supple.   Skin:     General: Skin is warm and dry.   Neurological:      General: No focal deficit present.      Mental Status: He is alert and oriented to person, place, and time. Mental status is at baseline.   Psychiatric:         Mood and Affect: Mood normal.         Behavior: Behavior normal.         Thought Content: Thought content normal.         Judgment: Judgment normal.         Assessment/Plan #1 recheck copper levels may #2 continue treatment for prostate cancer  Problems Addressed this Visit        Hematology and Neoplasia    Prostate cancer (HCC) (Chronic)      Other Visit Diagnoses     Excessive copper intake    -  Primary      Diagnoses     Diagnosis Codes Comments    Excessive copper intake    -  Primary ICD-10-CM: R68.89  ICD-9-CM: 796.4     Prostate cancer (HCC)     ICD-10-CM: C61  ICD-9-CM: 185

## 2022-03-24 ENCOUNTER — OFFICE VISIT (OUTPATIENT)
Dept: FAMILY MEDICINE CLINIC | Facility: CLINIC | Age: 77
End: 2022-03-24

## 2022-03-24 VITALS
HEIGHT: 75 IN | BODY MASS INDEX: 33.42 KG/M2 | DIASTOLIC BLOOD PRESSURE: 70 MMHG | WEIGHT: 268.8 LBS | TEMPERATURE: 97.1 F | OXYGEN SATURATION: 98 % | SYSTOLIC BLOOD PRESSURE: 110 MMHG | HEART RATE: 57 BPM

## 2022-03-24 DIAGNOSIS — M89.8X1 PAIN OF RIGHT SCAPULA: Primary | ICD-10-CM

## 2022-03-24 PROCEDURE — 73030 X-RAY EXAM OF SHOULDER: CPT | Performed by: NURSE PRACTITIONER

## 2022-03-24 PROCEDURE — 99213 OFFICE O/P EST LOW 20 MIN: CPT | Performed by: NURSE PRACTITIONER

## 2022-03-24 NOTE — PATIENT INSTRUCTIONS
Xray today will call with results.   He will try heat, ice as needed, avoid over head or triggering movements.   Tylenol or ibuprofen as needed OTC for pain,  If symptoms persist call office can consider PT or ortho consult if needed.   Patient agrees with plan of care and understands instructions. Call if worsening symptoms or any problems or concerns.

## 2022-03-24 NOTE — PROGRESS NOTES
"Chief Complaint  Shoulder Pain (Right side /states hurt it getting out of chair last week)    Subjective          Ramiro Osborne presents to [facility]  History of Present Illness  C/o right shoulder pain, states pain with getting out of a chair last week. States pain into right scapula, he noticed pain and heard pop about 1 week ago when getting up out of a chair, he denies weakness, denies pain or decreased , he has pain with pushing up to get out of chair, pain with certain movements. Denies numbness or tingling, he is right handed. He did not try anything OTC.         Objective   Vital Signs:   /70 (BP Location: Left arm, Patient Position: Sitting, Cuff Size: Large Adult)   Pulse 57   Temp 97.1 °F (36.2 °C) (Infrared)   Ht 190.5 cm (75\")   Wt 122 kg (268 lb 12.8 oz)   SpO2 98%   BMI 33.60 kg/m²            Physical Exam  Vitals and nursing note reviewed.   Constitutional:       Appearance: He is well-developed.   HENT:      Head: Normocephalic.   Eyes:      Pupils: Pupils are equal, round, and reactive to light.   Cardiovascular:      Rate and Rhythm: Normal rate and regular rhythm.      Heart sounds: Normal heart sounds.   Pulmonary:      Effort: Pulmonary effort is normal.      Breath sounds: Normal breath sounds.   Musculoskeletal:      Right shoulder: No tenderness or bony tenderness. Normal range of motion. Normal strength. Normal pulse.      Left shoulder: Normal.        Arms:       Cervical back: Full passive range of motion without pain.   Skin:     General: Skin is warm and dry.   Neurological:      Mental Status: He is alert and oriented to person, place, and time.   Psychiatric:         Behavior: Behavior normal.         Judgment: Judgment normal.      rigth shoulder xray today for pain, no comparison shows NAD awaiting radiology over read.     Result Review :                 Assessment and Plan    Diagnoses and all orders for this visit:    1. Pain of " right scapula (Primary)  -     Cancel: XR Shoulder 2+ View Right (In Office)  -     XR Shoulder 2+ View Right (In Office)        Follow Up   Return if symptoms worsen or fail to improve.  Patient was given instructions and counseling regarding his condition or for health maintenance advice. Please see specific information pulled into the AVS if appropriate.     Xray today will call with results.   He will try heat, ice as needed, avoid over head or triggering movements.   Tylenol or ibuprofen as needed OTC for pain,  If symptoms persist call office can consider PT or ortho consult if needed.   Patient agrees with plan of care and understands instructions. Call if worsening symptoms or any problems or concerns.

## 2022-04-26 ENCOUNTER — TRANSCRIBE ORDERS (OUTPATIENT)
Dept: ADMINISTRATIVE | Facility: HOSPITAL | Age: 77
End: 2022-04-26

## 2022-04-26 DIAGNOSIS — C61 PROSTATE CA: Primary | ICD-10-CM

## 2022-05-24 ENCOUNTER — TELEPHONE (OUTPATIENT)
Dept: FAMILY MEDICINE CLINIC | Facility: CLINIC | Age: 77
End: 2022-05-24

## 2022-05-24 DIAGNOSIS — R78.79 HIGH BLOOD COPPER LEVEL: ICD-10-CM

## 2022-05-24 DIAGNOSIS — R79.9 ABNORMAL FINDING OF BLOOD CHEMISTRY, UNSPECIFIED: ICD-10-CM

## 2022-05-24 DIAGNOSIS — M15.9 PRIMARY OSTEOARTHRITIS INVOLVING MULTIPLE JOINTS: ICD-10-CM

## 2022-05-24 DIAGNOSIS — E55.9 VITAMIN D DEFICIENCY, UNSPECIFIED: ICD-10-CM

## 2022-05-24 DIAGNOSIS — E61.1 LOW IRON: ICD-10-CM

## 2022-05-24 DIAGNOSIS — E83.00 DISORDER OF COPPER METABOLISM: Primary | ICD-10-CM

## 2022-05-24 NOTE — TELEPHONE ENCOUNTER
Caller: Ramiro Osborne    Relationship to patient: Self    Best call back number:[phone]    Patient is needing: PATIENT CALLING TO REMIND DR. CENTENO TO ENTER THE FREE COPPER LAB. LAB APPT 5/25/22

## 2022-05-25 ENCOUNTER — LAB (OUTPATIENT)
Dept: FAMILY MEDICINE CLINIC | Facility: CLINIC | Age: 77
End: 2022-05-25

## 2022-05-25 DIAGNOSIS — E55.9 VITAMIN D DEFICIENCY, UNSPECIFIED: ICD-10-CM

## 2022-05-25 DIAGNOSIS — E61.1 LOW IRON: ICD-10-CM

## 2022-05-25 DIAGNOSIS — R78.79 HIGH BLOOD COPPER LEVEL: ICD-10-CM

## 2022-05-25 DIAGNOSIS — M15.9 PRIMARY OSTEOARTHRITIS INVOLVING MULTIPLE JOINTS: ICD-10-CM

## 2022-05-25 LAB
25(OH)D3 SERPL-MCNC: 34.5 NG/ML (ref 30–100)
ALBUMIN SERPL-MCNC: 4 G/DL (ref 3.5–5.2)
ALBUMIN/GLOB SERPL: 1.7 G/DL
ALP SERPL-CCNC: 118 U/L (ref 39–117)
ALT SERPL W P-5'-P-CCNC: 20 U/L (ref 1–41)
ANION GAP SERPL CALCULATED.3IONS-SCNC: 9.8 MMOL/L (ref 5–15)
AST SERPL-CCNC: 27 U/L (ref 1–40)
BILIRUB SERPL-MCNC: 0.4 MG/DL (ref 0–1.2)
BUN SERPL-MCNC: 23 MG/DL (ref 8–23)
BUN/CREAT SERPL: 29.5 (ref 7–25)
CALCIUM SPEC-SCNC: 8.4 MG/DL (ref 8.6–10.5)
CHLORIDE SERPL-SCNC: 104 MMOL/L (ref 98–107)
CHOLEST SERPL-MCNC: 157 MG/DL (ref 0–200)
CO2 SERPL-SCNC: 22.2 MMOL/L (ref 22–29)
CREAT SERPL-MCNC: 0.78 MG/DL (ref 0.76–1.27)
DEPRECATED RDW RBC AUTO: 43.3 FL (ref 37–54)
EGFRCR SERPLBLD CKD-EPI 2021: 92.4 ML/MIN/1.73
ERYTHROCYTE [DISTWIDTH] IN BLOOD BY AUTOMATED COUNT: 12.5 % (ref 12.3–15.4)
FERRITIN SERPL-MCNC: 132 NG/ML (ref 30–400)
GLOBULIN UR ELPH-MCNC: 2.4 GM/DL
GLUCOSE SERPL-MCNC: 98 MG/DL (ref 65–99)
HCT VFR BLD AUTO: 45.1 % (ref 37.5–51)
HDLC SERPL-MCNC: 51 MG/DL (ref 40–60)
HGB BLD-MCNC: 14.4 G/DL (ref 13–17.7)
LDLC SERPL CALC-MCNC: 83 MG/DL (ref 0–100)
LDLC/HDLC SERPL: 1.58 {RATIO}
MCH RBC QN AUTO: 29.7 PG (ref 26.6–33)
MCHC RBC AUTO-ENTMCNC: 31.9 G/DL (ref 31.5–35.7)
MCV RBC AUTO: 93 FL (ref 79–97)
PLATELET # BLD AUTO: 134 10*3/MM3 (ref 140–450)
PMV BLD AUTO: 13.5 FL (ref 6–12)
POTASSIUM SERPL-SCNC: 3.9 MMOL/L (ref 3.5–5.2)
PROT SERPL-MCNC: 6.4 G/DL (ref 6–8.5)
RBC # BLD AUTO: 4.85 10*6/MM3 (ref 4.14–5.8)
SODIUM SERPL-SCNC: 136 MMOL/L (ref 136–145)
TRIGL SERPL-MCNC: 128 MG/DL (ref 0–150)
VLDLC SERPL-MCNC: 23 MG/DL (ref 5–40)
WBC NRBC COR # BLD: 5.28 10*3/MM3 (ref 3.4–10.8)

## 2022-05-25 PROCEDURE — 82306 VITAMIN D 25 HYDROXY: CPT | Performed by: INTERNAL MEDICINE

## 2022-05-25 PROCEDURE — 36415 COLL VENOUS BLD VENIPUNCTURE: CPT | Performed by: INTERNAL MEDICINE

## 2022-05-25 PROCEDURE — 85027 COMPLETE CBC AUTOMATED: CPT | Performed by: INTERNAL MEDICINE

## 2022-05-25 PROCEDURE — 82728 ASSAY OF FERRITIN: CPT | Performed by: INTERNAL MEDICINE

## 2022-05-25 PROCEDURE — 80053 COMPREHEN METABOLIC PANEL: CPT | Performed by: INTERNAL MEDICINE

## 2022-05-25 PROCEDURE — 80061 LIPID PANEL: CPT | Performed by: INTERNAL MEDICINE

## 2022-05-26 ENCOUNTER — HOSPITAL ENCOUNTER (OUTPATIENT)
Dept: PET IMAGING | Facility: HOSPITAL | Age: 77
Discharge: HOME | End: 2022-05-26

## 2022-05-26 DIAGNOSIS — C61 PROSTATE CA: ICD-10-CM

## 2022-05-26 PROCEDURE — 78815 PET IMAGE W/CT SKULL-THIGH: CPT

## 2022-05-26 PROCEDURE — A9595 PIFLUFOLASTAT F 18 9 MCI SOLUTION PREFILLED SYRINGE: HCPCS | Performed by: NURSE PRACTITIONER

## 2022-05-26 PROCEDURE — 0 PIFLUFOLASTAT F 18 9 MCI SOLUTION PREFILLED SYRINGE: Performed by: NURSE PRACTITIONER

## 2022-05-26 RX ADMIN — PIFLUFOLASTAT F-18 1 DOSE: 80 INJECTION INTRAVENOUS at 11:35

## 2022-06-01 ENCOUNTER — OFFICE VISIT (OUTPATIENT)
Dept: FAMILY MEDICINE CLINIC | Facility: CLINIC | Age: 77
End: 2022-06-01

## 2022-06-01 VITALS
SYSTOLIC BLOOD PRESSURE: 120 MMHG | BODY MASS INDEX: 32.92 KG/M2 | HEART RATE: 62 BPM | TEMPERATURE: 97.1 F | OXYGEN SATURATION: 95 % | WEIGHT: 264.8 LBS | HEIGHT: 75 IN | DIASTOLIC BLOOD PRESSURE: 70 MMHG

## 2022-06-01 DIAGNOSIS — E83.42 HYPOMAGNESEMIA: ICD-10-CM

## 2022-06-01 DIAGNOSIS — Z00.00 MEDICARE ANNUAL WELLNESS VISIT, SUBSEQUENT: Primary | ICD-10-CM

## 2022-06-01 DIAGNOSIS — H93.19 TINNITUS, UNSPECIFIED LATERALITY: ICD-10-CM

## 2022-06-01 DIAGNOSIS — R73.01 IMPAIRED FASTING GLUCOSE: ICD-10-CM

## 2022-06-01 DIAGNOSIS — C61 PROSTATE CANCER: Chronic | ICD-10-CM

## 2022-06-01 PROCEDURE — 99214 OFFICE O/P EST MOD 30 MIN: CPT | Performed by: INTERNAL MEDICINE

## 2022-06-01 NOTE — PROGRESS NOTES
Subjective   Ramiro Osborne is a 77 y.o. male.     Vitals:    06/01/22 0851   BP: 120/70   Pulse: 62   Temp: 97.1 °F (36.2 °C)   SpO2: 95%      Body mass index is 33.1 kg/m².     History of Present Illness   Patient was seen for Medicare wellness exam.  Patient was seen for prostate cancer.  Patient's prostate cancer metastasized to his hip.  Patient is consulting his urology doctors for treatment plans.  Patient is on Prolia and had a calcium level of 8.4.  Patient began calcium supplementations still taking vitamin D 1000 units daily.  Patient's magnesium levels being checked along with free copper levels.  Patient's blood sugars been running 98.  Triglycerides 128, HDL 51, LDL 83, vitamin D 34.5, H&H of 14.4 and 45.1.    Dictated utilizing Dragon dictation. If there are questions or for further clarification, please contact me.  The following portions of the patient's history were reviewed and updated as appropriate: allergies, current medications, past family history, past medical history, past social history, past surgical history and problem list.    Review of Systems   Constitutional: Negative for fatigue and fever.   HENT: Positive for congestion. Negative for trouble swallowing.    Eyes: Negative for discharge and visual disturbance.   Respiratory: Negative for choking and shortness of breath.    Cardiovascular: Negative for chest pain and palpitations.   Gastrointestinal: Negative for abdominal pain and blood in stool.   Endocrine: Negative.    Genitourinary: Negative for genital sores and hematuria.   Musculoskeletal: Negative for gait problem and joint swelling.   Skin: Negative for color change, pallor, rash and wound.   Allergic/Immunologic: Positive for environmental allergies. Negative for immunocompromised state.   Neurological: Negative for facial asymmetry and speech difficulty.   Psychiatric/Behavioral: Negative for hallucinations and suicidal ideas.       Objective   Physical Exam  Vitals and  nursing note reviewed.   Constitutional:       Appearance: Normal appearance. He is well-developed.   HENT:      Head: Normocephalic and atraumatic.      Nose: Nose normal.      Mouth/Throat:      Mouth: Mucous membranes are moist.      Pharynx: Oropharynx is clear.   Eyes:      Extraocular Movements: Extraocular movements intact.      Conjunctiva/sclera: Conjunctivae normal.      Pupils: Pupils are equal, round, and reactive to light.   Cardiovascular:      Rate and Rhythm: Normal rate and regular rhythm.      Heart sounds: Normal heart sounds. No murmur heard.    No friction rub. No gallop.   Pulmonary:      Effort: Pulmonary effort is normal. No respiratory distress.      Breath sounds: Normal breath sounds. No stridor. No wheezing, rhonchi or rales.   Chest:      Chest wall: No tenderness.   Abdominal:      General: Bowel sounds are normal.      Palpations: Abdomen is soft.   Musculoskeletal:         General: Normal range of motion.      Cervical back: Normal range of motion and neck supple.   Skin:     General: Skin is warm and dry.   Neurological:      General: No focal deficit present.      Mental Status: He is alert and oriented to person, place, and time. Mental status is at baseline.   Psychiatric:         Mood and Affect: Mood normal.         Behavior: Behavior normal.         Thought Content: Thought content normal.         Judgment: Judgment normal.         Assessment & Plan #1 continue monitoring copper and magnesium level #2 follow-up with urology #3 continue present medical treatment  Problems Addressed this Visit        ENT    Tinnitus       Endocrine and Metabolic    Impaired fasting glucose       Hematology and Neoplasia    Prostate cancer (HCC) (Chronic)      Other Visit Diagnoses     Medicare annual wellness visit, subsequent    -  Primary    Hypomagnesemia        Relevant Orders    Magnesium      Diagnoses     Diagnosis Codes Comments    Medicare annual wellness visit, subsequent    -  Primary  ICD-10-CM: Z00.00  ICD-9-CM: V70.0     Hypomagnesemia     ICD-10-CM: E83.42  ICD-9-CM: 275.2     Prostate cancer (HCC)     ICD-10-CM: C61  ICD-9-CM: 185     Impaired fasting glucose     ICD-10-CM: R73.01  ICD-9-CM: 790.21     Tinnitus, unspecified laterality     ICD-10-CM: H93.19  ICD-9-CM: 388.30

## 2022-06-01 NOTE — PATIENT INSTRUCTIONS
Medicare Wellness  Personal Prevention Plan of Service     Date of Office Visit:    Encounter Provider:  Kris Man MD  Place of Service:  [facility]  Patient Name: Ramiro Osborne  :  1945    As part of the Medicare Wellness portion of your visit today, we are providing you with this personalized preventive plan of services (PPPS). This plan is based upon recommendations of the [place] Preventive Services Task Force (USPSTF) and the Advisory Committee on Immunization Practices (ACIP).    This lists the preventive care services that should be considered, and provides dates of when you are due. Items listed as completed are up-to-date and do not require any further intervention.    Health Maintenance   Topic Date Due    COVID-19 Vaccine (4 - Booster for Pfizer series) 2022    INFLUENZA VACCINE  2022    LIPID PANEL  2023    ANNUAL WELLNESS VISIT  2023    TDAP/TD VACCINES (2 - Td or Tdap) 2023    COLORECTAL CANCER SCREENING  2026    HEPATITIS C SCREENING  Completed    Pneumococcal Vaccine 65+  Completed    ZOSTER VACCINE  Completed       Orders Placed This Encounter   Procedures    Magnesium     Order Specific Question:   Release to patient     Answer:   Immediate       Return in about 6 months (around 2022), or if symptoms worsen or fail to improve, for Recheck.

## 2022-06-09 ENCOUNTER — IMMUNIZATION (OUTPATIENT)
Dept: FAMILY MEDICINE CLINIC | Facility: CLINIC | Age: 77
End: 2022-06-09

## 2022-06-09 PROCEDURE — 91305 COVID-19 (PFIZER) 12+ YRS: CPT | Performed by: INTERNAL MEDICINE

## 2022-06-09 PROCEDURE — 0054A COVID-19 (PFIZER) 12+ YRS: CPT | Performed by: INTERNAL MEDICINE

## 2022-07-26 ENCOUNTER — LAB (OUTPATIENT)
Dept: FAMILY MEDICINE CLINIC | Facility: CLINIC | Age: 77
End: 2022-07-26

## 2022-07-26 LAB — MAGNESIUM SERPL-MCNC: 2.4 MG/DL (ref 1.6–2.4)

## 2022-07-26 PROCEDURE — 36415 COLL VENOUS BLD VENIPUNCTURE: CPT | Performed by: INTERNAL MEDICINE

## 2022-07-26 PROCEDURE — 83735 ASSAY OF MAGNESIUM: CPT | Performed by: INTERNAL MEDICINE

## 2022-09-21 ENCOUNTER — TRANSCRIBE ORDERS (OUTPATIENT)
Dept: ADMINISTRATIVE | Facility: HOSPITAL | Age: 77
End: 2022-09-21

## 2022-09-21 DIAGNOSIS — C61 PROSTATE CANCER: Primary | ICD-10-CM

## 2022-09-21 DIAGNOSIS — R53.83 OTHER FATIGUE: ICD-10-CM

## 2022-09-23 ENCOUNTER — HOSPITAL ENCOUNTER (OUTPATIENT)
Dept: INFUSION THERAPY | Facility: HOSPITAL | Age: 77
Discharge: HOME | End: 2022-09-23

## 2022-09-27 ENCOUNTER — IMMUNIZATION (OUTPATIENT)
Dept: VACCINE CLINIC | Facility: HOSPITAL | Age: 77
End: 2022-09-27

## 2022-09-27 DIAGNOSIS — Z23 NEED FOR VACCINATION: Primary | ICD-10-CM

## 2022-09-27 PROCEDURE — 0124A: CPT | Performed by: INTERNAL MEDICINE

## 2022-09-27 PROCEDURE — 91312 HC SARSCOV2 VAC 30MCG/0.3ML IM BIVALENT BOOSTER 12 YRS AND OLDER: CPT | Performed by: INTERNAL MEDICINE

## 2022-10-10 ENCOUNTER — FLU SHOT (OUTPATIENT)
Dept: FAMILY MEDICINE CLINIC | Facility: CLINIC | Age: 77
End: 2022-10-10

## 2022-10-10 DIAGNOSIS — Z23 FLU VACCINE NEED: ICD-10-CM

## 2022-10-10 PROCEDURE — 90662 IIV NO PRSV INCREASED AG IM: CPT | Performed by: NURSE PRACTITIONER

## 2022-10-10 PROCEDURE — G0008 ADMIN INFLUENZA VIRUS VAC: HCPCS | Performed by: NURSE PRACTITIONER

## 2022-11-22 DIAGNOSIS — Z12.5 ENCOUNTER FOR SCREENING FOR MALIGNANT NEOPLASM OF PROSTATE: ICD-10-CM

## 2022-11-22 DIAGNOSIS — Z12.5 PROSTATE CANCER SCREENING: Primary | ICD-10-CM

## 2022-11-22 DIAGNOSIS — E55.9 VITAMIN D DEFICIENCY, UNSPECIFIED: ICD-10-CM

## 2022-11-22 DIAGNOSIS — R68.89 EXCESSIVE COPPER INTAKE: ICD-10-CM

## 2022-11-22 DIAGNOSIS — E61.1 LOW IRON: ICD-10-CM

## 2022-11-22 DIAGNOSIS — E83.42 HYPOMAGNESEMIA: ICD-10-CM

## 2022-11-22 DIAGNOSIS — C61 PROSTATE CANCER: ICD-10-CM

## 2022-11-22 DIAGNOSIS — E78.2 MIXED HYPERLIPIDEMIA: ICD-10-CM

## 2022-11-23 DIAGNOSIS — E61.1 LOW IRON: ICD-10-CM

## 2022-11-23 DIAGNOSIS — E78.2 MIXED HYPERLIPIDEMIA: ICD-10-CM

## 2022-11-23 DIAGNOSIS — C61 PROSTATE CANCER: ICD-10-CM

## 2022-11-23 DIAGNOSIS — R68.89 EXCESSIVE COPPER INTAKE: ICD-10-CM

## 2022-11-23 DIAGNOSIS — E55.9 VITAMIN D DEFICIENCY, UNSPECIFIED: ICD-10-CM

## 2022-11-23 DIAGNOSIS — E83.42 HYPOMAGNESEMIA: ICD-10-CM

## 2022-11-23 DIAGNOSIS — Z12.5 ENCOUNTER FOR SCREENING FOR MALIGNANT NEOPLASM OF PROSTATE: ICD-10-CM

## 2022-12-07 ENCOUNTER — OFFICE VISIT (OUTPATIENT)
Dept: FAMILY MEDICINE CLINIC | Facility: CLINIC | Age: 77
End: 2022-12-07

## 2022-12-07 VITALS
WEIGHT: 264.4 LBS | HEART RATE: 62 BPM | OXYGEN SATURATION: 99 % | SYSTOLIC BLOOD PRESSURE: 114 MMHG | HEIGHT: 75 IN | TEMPERATURE: 98.4 F | DIASTOLIC BLOOD PRESSURE: 68 MMHG | BODY MASS INDEX: 32.87 KG/M2

## 2022-12-07 DIAGNOSIS — M41.00 INFANTILE IDIOPATHIC SCOLIOSIS, UNSPECIFIED SPINAL REGION: ICD-10-CM

## 2022-12-07 DIAGNOSIS — R73.01 IMPAIRED FASTING GLUCOSE: Primary | ICD-10-CM

## 2022-12-07 DIAGNOSIS — S61.219A FINGER LACERATION, INITIAL ENCOUNTER: ICD-10-CM

## 2022-12-07 PROCEDURE — 99212 OFFICE O/P EST SF 10 MIN: CPT | Performed by: INTERNAL MEDICINE

## 2022-12-07 PROCEDURE — 90714 TD VACC NO PRESV 7 YRS+ IM: CPT | Performed by: INTERNAL MEDICINE

## 2022-12-07 PROCEDURE — 90471 IMMUNIZATION ADMIN: CPT | Performed by: INTERNAL MEDICINE

## 2022-12-07 NOTE — PROGRESS NOTES
"Chief Complaint  cut thumb mon night and go over labs one week ago    Subjective        Ramiro Osborne presents to [facility]  History of Present Illness patient was seen for fluctuating blood sugars.  Patient's labs are pending at the time of this dictation.  Patient did cut his finger with a knife and is getting a DT vaccine.  Patient also have scoliosis but is doing well at the present time.    Dictated utilizing Dragon dictation. If there are questions or for further clarification, please contact me.    Objective   Vital Signs:  Blood Pressure 114/68   Pulse 62   Temperature 98.4 °F (36.9 °C)   Height 190.5 cm (75\")   Weight 120 kg (264 lb 6.4 oz)   Oxygen Saturation 99%   Body Mass Index 33.05 kg/m²   Estimated body mass index is 33.05 kg/m² as calculated from the following:    Height as of this encounter: 190.5 cm (75\").    Weight as of this encounter: 120 kg (264 lb 6.4 oz).          Physical Exam  Vitals and nursing note reviewed.   Constitutional:       Appearance: Normal appearance. He is well-developed.   HENT:      Head: Normocephalic and atraumatic.      Nose: Nose normal.      Mouth/Throat:      Mouth: Mucous membranes are moist.      Pharynx: Oropharynx is clear.   Eyes:      Extraocular Movements: Extraocular movements intact.      Conjunctiva/sclera: Conjunctivae normal.      Pupils: Pupils are equal, round, and reactive to light.   Cardiovascular:      Rate and Rhythm: Normal rate and regular rhythm.      Heart sounds: Normal heart sounds. No murmur heard.    No friction rub. No gallop.   Pulmonary:      Effort: Pulmonary effort is normal. No respiratory distress.      Breath sounds: Normal breath sounds. No stridor. No wheezing, rhonchi or rales.   Chest:      Chest wall: No tenderness.   Abdominal:      General: Bowel sounds are normal.      Palpations: Abdomen is soft.   Musculoskeletal:         General: Normal range of motion.      Cervical back: Normal " range of motion and neck supple.   Skin:     General: Skin is warm and dry.      Findings: Lesion present.   Neurological:      General: No focal deficit present.      Mental Status: He is alert and oriented to person, place, and time. Mental status is at baseline.   Psychiatric:         Mood and Affect: Mood normal.         Behavior: Behavior normal.         Thought Content: Thought content normal.         Judgment: Judgment normal.        Result Review :                Assessment and Plan  #1 DT vaccine #2 follow-up 6 months  Diagnoses and all orders for this visit:    1. Impaired fasting glucose (Primary)    2. Finger laceration, initial encounter    3. Infantile idiopathic scoliosis, unspecified spinal region    Other orders  -     Td Vaccine Greater Than or Equal To 8yo Preservative Free IM  -     SCANNED - LABS             Follow Up   Return in about 6 months (around 6/7/2023), or if symptoms worsen or fail to improve, for Recheck.  Patient was given instructions and counseling regarding his condition or for health maintenance advice. Please see specific information pulled into the AVS if appropriate.

## (undated) DEVICE — TRAP,MUCUS SPECIMEN, 80CC: Brand: MEDLINE

## (undated) DEVICE — CANNULA,ADULT,SOFT-TOUCH,7'TUBE,UC: Brand: PENDING

## (undated) DEVICE — ADAPT SWVL FIBROPTIC BRONCH

## (undated) DEVICE — VITAL SIGNS™ JACKSON-REES CIRCUITS: Brand: VITAL SIGNS™

## (undated) DEVICE — LN SMPL O2 NASL/ORL SMART/CAPNOLINE PLS A/

## (undated) DEVICE — TUBING, SUCTION, 1/4" X 10', STRAIGHT: Brand: MEDLINE

## (undated) DEVICE — SINGLE USE SUCTION VALVE MAJ-209: Brand: SINGLE USE SUCTION VALVE (STERILE)

## (undated) DEVICE — SINGLE USE BIOPSY VALVE MAJ-210: Brand: SINGLE USE BIOPSY VALVE (STERILE)

## (undated) DEVICE — MSK AIRWY LARYNG LMA PILOT SZ4